# Patient Record
Sex: MALE | Race: WHITE | NOT HISPANIC OR LATINO | Employment: OTHER | URBAN - NONMETROPOLITAN AREA
[De-identification: names, ages, dates, MRNs, and addresses within clinical notes are randomized per-mention and may not be internally consistent; named-entity substitution may affect disease eponyms.]

---

## 2022-11-04 ENCOUNTER — HOSPITAL ENCOUNTER (EMERGENCY)
Facility: HOSPITAL | Age: 87
Discharge: LEFT BEFORE DISCHARGE | End: 2022-11-04

## 2022-11-04 VITALS
HEART RATE: 79 BPM | DIASTOLIC BLOOD PRESSURE: 67 MMHG | BODY MASS INDEX: 26.6 KG/M2 | TEMPERATURE: 98.4 F | WEIGHT: 190 LBS | HEIGHT: 71 IN | SYSTOLIC BLOOD PRESSURE: 144 MMHG | OXYGEN SATURATION: 99 % | RESPIRATION RATE: 18 BRPM

## 2022-11-04 PROCEDURE — 99211 OFF/OP EST MAY X REQ PHY/QHP: CPT

## 2022-11-04 NOTE — ED NOTES
Pt approached nursing staff stating he is going to leave.  Zak CONDE offered to get provider to talk with pt.   Pt states that it is not necessary and he will f/u with his PCP.  Zak CONDE instructed pt and spouse that if anything new or worsening to come back to ED.  Pt not in any distress at this time.  No neuro deficits noted.  Vss.  Pt A/Ox 4.  Pt ambulated out of ED area with spouse.  Racquel ALLISON aware.

## 2022-11-06 ENCOUNTER — APPOINTMENT (OUTPATIENT)
Dept: CT IMAGING | Facility: HOSPITAL | Age: 87
End: 2022-11-06

## 2022-11-06 ENCOUNTER — APPOINTMENT (OUTPATIENT)
Dept: GENERAL RADIOLOGY | Facility: HOSPITAL | Age: 87
End: 2022-11-06

## 2022-11-06 ENCOUNTER — HOSPITAL ENCOUNTER (OUTPATIENT)
Facility: HOSPITAL | Age: 87
Setting detail: OBSERVATION
Discharge: HOME | End: 2022-11-07
Attending: FAMILY MEDICINE | Admitting: FAMILY MEDICINE

## 2022-11-06 DIAGNOSIS — R55 SYNCOPE, UNSPECIFIED SYNCOPE TYPE: ICD-10-CM

## 2022-11-06 DIAGNOSIS — I48.91 ATRIAL FIBRILLATION, UNSPECIFIED TYPE: ICD-10-CM

## 2022-11-06 DIAGNOSIS — R41.82 ALTERED MENTAL STATUS, UNSPECIFIED ALTERED MENTAL STATUS TYPE: Primary | ICD-10-CM

## 2022-11-06 DIAGNOSIS — N39.0 URINARY TRACT INFECTION WITHOUT HEMATURIA, SITE UNSPECIFIED: ICD-10-CM

## 2022-11-06 PROBLEM — R73.9 ELEVATED BLOOD SUGAR: Status: ACTIVE | Noted: 2022-11-06

## 2022-11-06 PROBLEM — N28.9 RENAL INSUFFICIENCY: Status: ACTIVE | Noted: 2022-11-06

## 2022-11-06 PROBLEM — D64.9 ANEMIA, UNSPECIFIED: Status: ACTIVE | Noted: 2022-11-06

## 2022-11-06 PROBLEM — I48.11 LONGSTANDING PERSISTENT ATRIAL FIBRILLATION: Status: ACTIVE | Noted: 2022-11-06

## 2022-11-06 LAB
ALBUMIN SERPL-MCNC: 4.5 G/DL (ref 3.5–5.2)
ALBUMIN/GLOB SERPL: 1.5 G/DL
ALP SERPL-CCNC: 98 U/L (ref 39–117)
ALT SERPL W P-5'-P-CCNC: 14 U/L (ref 1–41)
ANION GAP SERPL CALCULATED.3IONS-SCNC: 8 MMOL/L (ref 5–15)
AST SERPL-CCNC: 22 U/L (ref 1–40)
BACTERIA UR QL AUTO: ABNORMAL /HPF
BASOPHILS # BLD AUTO: 0.05 10*3/MM3 (ref 0–0.2)
BASOPHILS NFR BLD AUTO: 0.6 % (ref 0–1.5)
BILIRUB SERPL-MCNC: 0.7 MG/DL (ref 0–1.2)
BILIRUB UR QL STRIP: NEGATIVE
BUN SERPL-MCNC: 33 MG/DL (ref 8–23)
BUN/CREAT SERPL: 25 (ref 7–25)
CALCIUM SPEC-SCNC: 9.4 MG/DL (ref 8.6–10.5)
CHLORIDE SERPL-SCNC: 103 MMOL/L (ref 98–107)
CHOLEST SERPL-MCNC: 180 MG/DL (ref 0–200)
CLARITY UR: ABNORMAL
CO2 SERPL-SCNC: 27 MMOL/L (ref 22–29)
COLOR UR: YELLOW
CREAT SERPL-MCNC: 1.32 MG/DL (ref 0.76–1.27)
CRP SERPL-MCNC: <0.3 MG/DL (ref 0–0.5)
D-LACTATE SERPL-SCNC: 1.1 MMOL/L (ref 0.5–2)
DEPRECATED RDW RBC AUTO: 50 FL (ref 37–54)
EGFRCR SERPLBLD CKD-EPI 2021: 52.5 ML/MIN/1.73
EOSINOPHIL # BLD AUTO: 0.09 10*3/MM3 (ref 0–0.4)
EOSINOPHIL NFR BLD AUTO: 1.1 % (ref 0.3–6.2)
ERYTHROCYTE [DISTWIDTH] IN BLOOD BY AUTOMATED COUNT: 14.9 % (ref 12.3–15.4)
GLOBULIN UR ELPH-MCNC: 3 GM/DL
GLUCOSE SERPL-MCNC: 110 MG/DL (ref 65–99)
GLUCOSE UR STRIP-MCNC: NEGATIVE MG/DL
HCT VFR BLD AUTO: 36.4 % (ref 37.5–51)
HDLC SERPL-MCNC: 65 MG/DL (ref 40–60)
HGB BLD-MCNC: 11.7 G/DL (ref 13–17.7)
HGB UR QL STRIP.AUTO: NEGATIVE
HYALINE CASTS UR QL AUTO: ABNORMAL /LPF
IMM GRANULOCYTES # BLD AUTO: 0.02 10*3/MM3 (ref 0–0.05)
IMM GRANULOCYTES NFR BLD AUTO: 0.3 % (ref 0–0.5)
KETONES UR QL STRIP: NEGATIVE
LDLC SERPL CALC-MCNC: 104 MG/DL (ref 0–100)
LDLC/HDLC SERPL: 1.59 {RATIO}
LEUKOCYTE ESTERASE UR QL STRIP.AUTO: ABNORMAL
LYMPHOCYTES # BLD AUTO: 1.38 10*3/MM3 (ref 0.7–3.1)
LYMPHOCYTES NFR BLD AUTO: 17.4 % (ref 19.6–45.3)
MAGNESIUM SERPL-MCNC: 2.3 MG/DL (ref 1.6–2.4)
MCH RBC QN AUTO: 29.5 PG (ref 26.6–33)
MCHC RBC AUTO-ENTMCNC: 32.1 G/DL (ref 31.5–35.7)
MCV RBC AUTO: 91.7 FL (ref 79–97)
MONOCYTES # BLD AUTO: 0.64 10*3/MM3 (ref 0.1–0.9)
MONOCYTES NFR BLD AUTO: 8.1 % (ref 5–12)
NEUTROPHILS NFR BLD AUTO: 5.74 10*3/MM3 (ref 1.7–7)
NEUTROPHILS NFR BLD AUTO: 72.5 % (ref 42.7–76)
NITRITE UR QL STRIP: NEGATIVE
NRBC BLD AUTO-RTO: 0 /100 WBC (ref 0–0.2)
PH UR STRIP.AUTO: 8 [PH] (ref 5–8)
PLATELET # BLD AUTO: 258 10*3/MM3 (ref 140–450)
PMV BLD AUTO: 8.9 FL (ref 6–12)
POTASSIUM SERPL-SCNC: 4.6 MMOL/L (ref 3.5–5.2)
PROT SERPL-MCNC: 7.5 G/DL (ref 6–8.5)
PROT UR QL STRIP: ABNORMAL
RBC # BLD AUTO: 3.97 10*6/MM3 (ref 4.14–5.8)
RBC # UR STRIP: ABNORMAL /HPF
REF LAB TEST METHOD: ABNORMAL
SODIUM SERPL-SCNC: 138 MMOL/L (ref 136–145)
SP GR UR STRIP: 1.02 (ref 1–1.03)
SQUAMOUS #/AREA URNS HPF: ABNORMAL /HPF
TRIGL SERPL-MCNC: 58 MG/DL (ref 0–150)
TROPONIN T SERPL-MCNC: <0.01 NG/ML (ref 0–0.03)
UROBILINOGEN UR QL STRIP: ABNORMAL
VLDLC SERPL-MCNC: 11 MG/DL (ref 5–40)
WBC # UR STRIP: ABNORMAL /HPF
WBC NRBC COR # BLD: 7.92 10*3/MM3 (ref 3.4–10.8)

## 2022-11-06 PROCEDURE — 93005 ELECTROCARDIOGRAM TRACING: CPT | Performed by: NURSE PRACTITIONER

## 2022-11-06 PROCEDURE — 93010 ELECTROCARDIOGRAM REPORT: CPT | Performed by: INTERNAL MEDICINE

## 2022-11-06 PROCEDURE — 70450 CT HEAD/BRAIN W/O DYE: CPT

## 2022-11-06 PROCEDURE — 99285 EMERGENCY DEPT VISIT HI MDM: CPT

## 2022-11-06 PROCEDURE — 81001 URINALYSIS AUTO W/SCOPE: CPT | Performed by: NURSE PRACTITIONER

## 2022-11-06 PROCEDURE — G0378 HOSPITAL OBSERVATION PER HR: HCPCS

## 2022-11-06 PROCEDURE — 25010000002 ENOXAPARIN PER 10 MG: Performed by: FAMILY MEDICINE

## 2022-11-06 PROCEDURE — 80061 LIPID PANEL: CPT | Performed by: FAMILY MEDICINE

## 2022-11-06 PROCEDURE — 36415 COLL VENOUS BLD VENIPUNCTURE: CPT | Performed by: FAMILY MEDICINE

## 2022-11-06 PROCEDURE — G0103 PSA SCREENING: HCPCS | Performed by: FAMILY MEDICINE

## 2022-11-06 PROCEDURE — 87086 URINE CULTURE/COLONY COUNT: CPT | Performed by: NURSE PRACTITIONER

## 2022-11-06 PROCEDURE — 83605 ASSAY OF LACTIC ACID: CPT | Performed by: NURSE PRACTITIONER

## 2022-11-06 PROCEDURE — 25010000002 CEFTRIAXONE PER 250 MG: Performed by: NURSE PRACTITIONER

## 2022-11-06 PROCEDURE — 96372 THER/PROPH/DIAG INJ SC/IM: CPT

## 2022-11-06 PROCEDURE — 96365 THER/PROPH/DIAG IV INF INIT: CPT

## 2022-11-06 PROCEDURE — 86140 C-REACTIVE PROTEIN: CPT | Performed by: NURSE PRACTITIONER

## 2022-11-06 PROCEDURE — 85025 COMPLETE CBC W/AUTO DIFF WBC: CPT | Performed by: NURSE PRACTITIONER

## 2022-11-06 PROCEDURE — 87040 BLOOD CULTURE FOR BACTERIA: CPT | Performed by: FAMILY MEDICINE

## 2022-11-06 PROCEDURE — 80306 DRUG TEST PRSMV INSTRMNT: CPT | Performed by: CLINICAL NURSE SPECIALIST

## 2022-11-06 PROCEDURE — 83735 ASSAY OF MAGNESIUM: CPT | Performed by: NURSE PRACTITIONER

## 2022-11-06 PROCEDURE — 71045 X-RAY EXAM CHEST 1 VIEW: CPT

## 2022-11-06 PROCEDURE — 80053 COMPREHEN METABOLIC PANEL: CPT | Performed by: NURSE PRACTITIONER

## 2022-11-06 PROCEDURE — 83036 HEMOGLOBIN GLYCOSYLATED A1C: CPT | Performed by: CLINICAL NURSE SPECIALIST

## 2022-11-06 PROCEDURE — 84484 ASSAY OF TROPONIN QUANT: CPT | Performed by: FAMILY MEDICINE

## 2022-11-06 PROCEDURE — 93005 ELECTROCARDIOGRAM TRACING: CPT | Performed by: FAMILY MEDICINE

## 2022-11-06 PROCEDURE — 84484 ASSAY OF TROPONIN QUANT: CPT | Performed by: NURSE PRACTITIONER

## 2022-11-06 RX ORDER — SODIUM CHLORIDE 0.9 % (FLUSH) 0.9 %
10 SYRINGE (ML) INJECTION AS NEEDED
Status: DISCONTINUED | OUTPATIENT
Start: 2022-11-06 | End: 2022-11-07 | Stop reason: HOSPADM

## 2022-11-06 RX ORDER — ONDANSETRON 2 MG/ML
4 INJECTION INTRAMUSCULAR; INTRAVENOUS EVERY 6 HOURS PRN
Status: DISCONTINUED | OUTPATIENT
Start: 2022-11-06 | End: 2022-11-07 | Stop reason: HOSPADM

## 2022-11-06 RX ORDER — NITROGLYCERIN 0.4 MG/1
0.4 TABLET SUBLINGUAL
Status: DISCONTINUED | OUTPATIENT
Start: 2022-11-06 | End: 2022-11-07 | Stop reason: HOSPADM

## 2022-11-06 RX ORDER — SODIUM CHLORIDE 0.9 % (FLUSH) 0.9 %
10 SYRINGE (ML) INJECTION EVERY 12 HOURS SCHEDULED
Status: DISCONTINUED | OUTPATIENT
Start: 2022-11-06 | End: 2022-11-07 | Stop reason: HOSPADM

## 2022-11-06 RX ORDER — ENOXAPARIN SODIUM 100 MG/ML
1 INJECTION SUBCUTANEOUS EVERY 12 HOURS
Status: DISCONTINUED | OUTPATIENT
Start: 2022-11-06 | End: 2022-11-07 | Stop reason: HOSPADM

## 2022-11-06 RX ADMIN — ENOXAPARIN SODIUM 90 MG: 100 INJECTION SUBCUTANEOUS at 21:14

## 2022-11-06 RX ADMIN — SODIUM CHLORIDE 1 G: 9 INJECTION, SOLUTION INTRAVENOUS at 19:49

## 2022-11-06 NOTE — ED PROVIDER NOTES
Subjective   History of Present Illness  Patient is an 86-year-old male that presents ER today with complaint of multiple near syncopal episodes.  The patient states that he had 3 of these yesterday and 1 today.  He states that that when these occur he feels disoriented and starts shaking all over.  He states that he does not actually lose consciousness.  He states he does not really know what happens during that timeframe though.  He states that today he had an episode when he went to put a video and the video player at home.  He did not fall to the ground.  He is had no recent falls or head injuries.  He has no headache, weakness, numbness or tingling.  He has no neurological deficits.  He has no history of seizures in the past.  He denies any loss of bowel or bladder control with these episodes, has but his tongue or sustain other injuries.  He states that the episode today lasted approximately 1 minute.  Patient states he had 3 episodes yesterday.  He states that he came to the ER last night but it was too busy so he left however after having an episode today his daughters encouraged him to come back to be evaluated.  He denies any medical problems and takes no medications.  He presents to the ER today for further evaluation.    History provided by:  Patient   used: No        Review of Systems   Neurological: Positive for tremors.   Psychiatric/Behavioral: Positive for confusion.   All other systems reviewed and are negative.      History reviewed. No pertinent past medical history.    No Known Allergies    No past surgical history on file.    History reviewed. No pertinent family history.    Social History     Socioeconomic History   • Marital status:            Objective   Physical Exam  Vitals and nursing note reviewed.   Constitutional:       Appearance: Normal appearance.   HENT:      Head: Normocephalic and atraumatic.      Right Ear: External ear normal.      Left Ear: External  ear normal.      Mouth/Throat:      Mouth: Mucous membranes are moist.      Pharynx: Oropharynx is clear.   Eyes:      Conjunctiva/sclera: Conjunctivae normal.      Pupils: Pupils are equal, round, and reactive to light.   Cardiovascular:      Rate and Rhythm: Normal rate and regular rhythm.   Pulmonary:      Effort: Pulmonary effort is normal.      Breath sounds: Normal breath sounds.   Abdominal:      General: Bowel sounds are normal.      Palpations: Abdomen is soft.   Skin:     General: Skin is warm and dry.      Capillary Refill: Capillary refill takes less than 2 seconds.   Neurological:      General: No focal deficit present.      Mental Status: He is alert and oriented to person, place, and time.      Comments: Pt A&O x 3, speech clear and appropriate, no facial droop or paresthesias noted, PERRL, EOM intact, tongue midline, shoulder shrug normal, no upper or lower ext drift noted, upper extremity strength 5 out of 5, lower extremity strength 5 out of 5.  Finger-to-nose test normal.  Heel-to-shin test normal.   Psychiatric:         Mood and Affect: Mood normal.         Behavior: Behavior normal.         Procedures           ED Course  ED Course as of 11/07/22 1938   Mon Nov 07, 2022 1935 Patient is labwork shows that he does have a urinary tract infection.  This was treated with Rocephin.  BUN of 33 with creatinine 1.3.  I have no further labs to compare this to.  EKG shows new onset A. fib rate controlled.  CT head and chest x-ray showed no acute findings.  I discussed the findings with the patient and family.  Has had 4 syncopal episodes in the past 2 days.  I recommended that he be admitted.  They agree with this.  Patient case discussed with Dr. Gottlieb who is kindly agreed with admission.  Admitted at this time in stable condition. [LF]   1937 Vance Syncope Rule - MDCalc  Calculated on Nov 07 2022 8:37 PM  Patient is NOT in the low-risk group for serious outcome. [LF]      ED Course User  Index  [LF] Bouchra Stiles, APRN                                 MRI Brain With & Without Contrast   Final Result   Impression:       1. No acute intracranial process. In particular, no acute ischemia.       This report was finalized on 11/07/2022 13:14 by Dr Malik Brand, .      US Carotid Bilateral   Final Result   Impression:   1. There is less than 50% stenosis of the right internal carotid artery.   2. There is less than 50% stenosis of the left internal carotid artery.   3. Antegrade flow is demonstrated in bilateral vertebral arteries.       Comments: Bilateral carotid vertebral arterial duplex scan was   performed.       Grayscale imaging shows intimal thickening and calcified elements at the   carotid bifurcation. The right internal carotid artery peak systolic   velocity is 77.6 cm/sec. The end-diastolic velocity is 30.4 cm/sec. The   right ICA/CCA ratio is approximately 1.3 . These findings correlate with   less than 50% stenosis of the right internal carotid artery.       Grayscale imaging shows intimal thickening and calcified elements at the   carotid bifurcation. The left internal carotid artery peak systolic   velocity is 70.7 cm/sec. The end-diastolic velocity is 32.7 cm/sec. The   left ICA/CCA ratio is approximately 0.8 . These findings correlate with   less than 50% stenosis of the left internal carotid artery.       Antegrade flow is demonstrated in bilateral vertebral arteries.       This report was finalized on 11/07/2022 13:00 by Dr. Jonny Silverman MD.      CT Head Without Contrast   Final Result   1. No hemorrhage, edema or mass effect. No acute findings.   2. Minimal age-appropriate atrophy and chronic small vessel ischemic   white matter disease.       The full report of this exam was immediately signed and available to the   emergency room. The patient is currently in the emergency room.       This report was finalized on 11/06/2022 18:44 by Dr. Cal Wilder MD.      XR Chest 1  View   Final Result   1. No acute appearing infiltrate.   2. Bronchial wall thickening, likely chronic.   3. Heart size is borderline.           This report was finalized on 11/06/2022 17:37 by Dr. Cal Wilder MD.        Labs Reviewed   COMPREHENSIVE METABOLIC PANEL - Abnormal; Notable for the following components:       Result Value    Glucose 110 (*)     BUN 33 (*)     Creatinine 1.32 (*)     eGFR 52.5 (*)     All other components within normal limits    Narrative:     GFR Normal >60  Chronic Kidney Disease <60  Kidney Failure <15    The GFR formula is only valid for adults with stable renal function between ages 18 and 70.   URINALYSIS W/ CULTURE IF INDICATED - Abnormal; Notable for the following components:    Appearance, UA Cloudy (*)     Protein, UA Trace (*)     Leuk Esterase, UA Large (3+) (*)     All other components within normal limits    Narrative:     In absence of clinical symptoms, the presence of pyuria, bacteria, and/or nitrites on the urinalysis result does not correlate with infection.   CBC WITH AUTO DIFFERENTIAL - Abnormal; Notable for the following components:    RBC 3.97 (*)     Hemoglobin 11.7 (*)     Hematocrit 36.4 (*)     Lymphocyte % 17.4 (*)     All other components within normal limits   URINALYSIS, MICROSCOPIC ONLY - Abnormal; Notable for the following components:    RBC, UA 3-5 (*)     WBC, UA Too Numerous to Count (*)     Bacteria, UA 4+ (*)     All other components within normal limits   LIPID PANEL - Abnormal; Notable for the following components:    HDL Cholesterol 65 (*)     LDL Cholesterol  104 (*)     All other components within normal limits    Narrative:     Cholesterol Reference Ranges  (U.S. Department of Health and Human Services ATP III Classifications)    Desirable          <200 mg/dL  Borderline High    200-239 mg/dL  High Risk          >240 mg/dL      Triglyceride Reference Ranges  (U.S. Department of Health and Human Services ATP III Classifications)    Normal            <150 mg/dL  Borderline High  150-199 mg/dL  High             200-499 mg/dL  Very High        >500 mg/dL    HDL Reference Ranges  (U.S. Department of Health and Human Services ATP III Classifications)    Low     <40 mg/dl (major risk factor for CHD)  High    >60 mg/dl ('negative' risk factor for CHD)        LDL Reference Ranges  (U.S. Department of Health and Human Services ATP III Classifications)    Optimal          <100 mg/dL  Near Optimal     100-129 mg/dL  Borderline High  130-159 mg/dL  High             160-189 mg/dL  Very High        >189 mg/dL   COMPREHENSIVE METABOLIC PANEL - Abnormal; Notable for the following components:    BUN 28 (*)     All other components within normal limits    Narrative:     GFR Normal >60  Chronic Kidney Disease <60  Kidney Failure <15    The GFR formula is only valid for adults with stable renal function between ages 18 and 70.   HEMOGLOBIN A1C - Abnormal; Notable for the following components:    Hemoglobin A1C 5.70 (*)     All other components within normal limits    Narrative:     Hemoglobin A1C Ranges:    Increased Risk for Diabetes  5.7% to 6.4%  Diabetes                     >= 6.5%  Diabetic Goal                < 7.0%   TROPONIN (IN-HOUSE) - Normal    Narrative:     Troponin T Reference Range:  <= 0.03 ng/mL-   Negative for AMI  >0.03 ng/mL-     Abnormal for myocardial necrosis.  Clinicians would have to utilize clinical acumen, EKG, Troponin and serial changes to determine if it is an Acute Myocardial Infarction or myocardial injury due to an underlying chronic condition.       Results may be falsely decreased if patient taking Biotin.     LACTIC ACID, PLASMA - Normal   C-REACTIVE PROTEIN - Normal   MAGNESIUM - Normal   TROPONIN (IN-HOUSE) - Normal    Narrative:     Troponin T Reference Range:  <= 0.03 ng/mL-   Negative for AMI  >0.03 ng/mL-     Abnormal for myocardial necrosis.  Clinicians would have to utilize clinical acumen, EKG, Troponin and serial changes to  determine if it is an Acute Myocardial Infarction or myocardial injury due to an underlying chronic condition.       Results may be falsely decreased if patient taking Biotin.     TROPONIN (IN-HOUSE) - Normal    Narrative:     Troponin T Reference Range:  <= 0.03 ng/mL-   Negative for AMI  >0.03 ng/mL-     Abnormal for myocardial necrosis.  Clinicians would have to utilize clinical acumen, EKG, Troponin and serial changes to determine if it is an Acute Myocardial Infarction or myocardial injury due to an underlying chronic condition.       Results may be falsely decreased if patient taking Biotin.     TROPONIN (IN-HOUSE) - Normal    Narrative:     Troponin T Reference Range:  <= 0.03 ng/mL-   Negative for AMI  >0.03 ng/mL-     Abnormal for myocardial necrosis.  Clinicians would have to utilize clinical acumen, EKG, Troponin and serial changes to determine if it is an Acute Myocardial Infarction or myocardial injury due to an underlying chronic condition.       Results may be falsely decreased if patient taking Biotin.     PSA SCREEN - Normal    Narrative:     Results may be falsely decreased if patient taking Biotin.     TSH - Normal   T4, FREE - Normal    Narrative:     Results may be falsely increased if patient taking Biotin.     URINE DRUG SCREEN - Normal    Narrative:     Cutoff For Drugs Screened:    Amphetamines               500 ng/ml  Barbiturates               200 ng/ml  Benzodiazepines            150 ng/ml  Cocaine                    150 ng/ml  Methadone                  200 ng/ml  Opiates                    100 ng/ml  Phencyclidine               25 ng/ml  THC                            50 ng/ml  Methamphetamine            500 ng/ml  Tricyclic Antidepressants  300 ng/ml  Oxycodone                  100 ng/ml  Propoxyphene               300 ng/ml  Buprenorphine               10 ng/ml    The normal value for all drugs tested is negative. This report includes unconfirmed screening results, with the cutoff  values listed, to be used for medical treatment purposes only.  Unconfirmed results must not be used for non-medical purposes such as employment or legal testing.  Clinical consideration should be applied to any drug of abuse test, particularly when unconfirmed results are used.     URINE CULTURE   BLOOD CULTURE   BLOOD CULTURE   VITAMIN B12   FOLATE   CBC AND DIFFERENTIAL    Narrative:     The following orders were created for panel order CBC & Differential.  Procedure                               Abnormality         Status                     ---------                               -----------         ------                     CBC Auto Differential[[id number]]        Abnormal            Final result                 Please view results for these tests on the individual orders.               MDM  Number of Diagnoses or Management Options  Altered mental status, unspecified altered mental status type: new and requires workup  Atrial fibrillation, unspecified type (HCC): new and requires workup  Syncope, unspecified syncope type: new and requires workup  Urinary tract infection without hematuria, site unspecified: new and requires workup     Amount and/or Complexity of Data Reviewed  Clinical lab tests: ordered and reviewed  Tests in the radiology section of CPT®: ordered and reviewed  Tests in the medicine section of CPT®: ordered and reviewed  Discuss the patient with other providers: yes (Dr. Harvey Gottlieb)    Patient Progress  Patient progress: stable      Final diagnoses:   Altered mental status, unspecified altered mental status type   Syncope, unspecified syncope type   Urinary tract infection without hematuria, site unspecified   Atrial fibrillation, unspecified type (HCC)       ED Disposition  ED Disposition     ED Disposition   Decision to Admit    Condition   --    Comment   Level of Care: Telemetry [5]   Diagnosis: Altered mental status, unspecified altered mental status type [[id number]]   Admitting  Physician: DEMETRIO PERRIN [[id number]]   Attending Physician: DEMETRIO PERRIN [[id number]]               Perez Manzo MD  [address]  [facility] [address]  [phone]    Follow up  Follow up new pt appt. on 11/10/2022 at 9:15 am    Cholo Huggins, APRN  [address] [facility] [address]  [facility] [address]  [phone]    Follow up  Follow up on 11/23/2022 at 3:45pm    Provider, No Known  [facility] [address]  [phone]               Medication List      New Prescriptions    atorvastatin 20 MG tablet  Commonly known as: Lipitor  Take 1 tablet by mouth Every Night.     cefdinir 300 MG capsule  Commonly known as: OMNICEF  Take 1 capsule by mouth 2 (Two) Times a Day for 3 days. Begin 11/8/2022  Start taking on: November 8, 2022  Notes to patient:  Next dose due tonight 11-7     Eliquis 5 MG tablet tablet  Generic drug: apixaban  Take 1 tablet by mouth 2 (Two) Times a Day.  Notes to patient: Next dose due 11-7           Where to Get Your Medications      These medications were sent to [facility] [place]  [address] [place] [address] [address], [place] [address]    Hours: 7AM-5PM Mon-Fri Phone: [phone]   · atorvastatin 20 MG tablet  · cefdinir 300 MG capsule  · Eliquis 5 MG tablet tablet          Bouchra Stiles, ENA  11/07/22 1938

## 2022-11-07 ENCOUNTER — APPOINTMENT (OUTPATIENT)
Dept: NEUROLOGY | Facility: HOSPITAL | Age: 87
End: 2022-11-07

## 2022-11-07 ENCOUNTER — APPOINTMENT (OUTPATIENT)
Dept: CARDIOLOGY | Facility: HOSPITAL | Age: 87
End: 2022-11-07

## 2022-11-07 ENCOUNTER — APPOINTMENT (OUTPATIENT)
Dept: MRI IMAGING | Facility: HOSPITAL | Age: 87
End: 2022-11-07

## 2022-11-07 ENCOUNTER — APPOINTMENT (OUTPATIENT)
Dept: ULTRASOUND IMAGING | Facility: HOSPITAL | Age: 87
End: 2022-11-07

## 2022-11-07 VITALS
BODY MASS INDEX: 25.9 KG/M2 | HEART RATE: 71 BPM | OXYGEN SATURATION: 95 % | SYSTOLIC BLOOD PRESSURE: 124 MMHG | DIASTOLIC BLOOD PRESSURE: 63 MMHG | WEIGHT: 185 LBS | HEIGHT: 71 IN | TEMPERATURE: 97.7 F | RESPIRATION RATE: 18 BRPM

## 2022-11-07 PROBLEM — R40.4 EPISODIC ALTERED AWARENESS: Status: ACTIVE | Noted: 2022-11-07

## 2022-11-07 PROBLEM — G93.41 METABOLIC ENCEPHALOPATHY: Status: ACTIVE | Noted: 2022-11-07

## 2022-11-07 PROBLEM — E78.49 OTHER HYPERLIPIDEMIA: Status: ACTIVE | Noted: 2022-11-07

## 2022-11-07 PROBLEM — I48.20 CHRONIC ATRIAL FIBRILLATION: Status: ACTIVE | Noted: 2022-11-07

## 2022-11-07 LAB
ALBUMIN SERPL-MCNC: 4.3 G/DL (ref 3.5–5.2)
ALBUMIN/GLOB SERPL: 1.3 G/DL
ALP SERPL-CCNC: 97 U/L (ref 39–117)
ALT SERPL W P-5'-P-CCNC: 16 U/L (ref 1–41)
AMPHET+METHAMPHET UR QL: NEGATIVE
AMPHETAMINES UR QL: NEGATIVE
ANION GAP SERPL CALCULATED.3IONS-SCNC: 8 MMOL/L (ref 5–15)
AST SERPL-CCNC: 23 U/L (ref 1–40)
BARBITURATES UR QL SCN: NEGATIVE
BENZODIAZ UR QL SCN: NEGATIVE
BH CV ECHO MEAS - AO MAX PG: 14 MMHG
BH CV ECHO MEAS - AO MEAN PG: 7 MMHG
BH CV ECHO MEAS - AO ROOT DIAM: 3.4 CM
BH CV ECHO MEAS - AO V2 MAX: 187 CM/SEC
BH CV ECHO MEAS - AO V2 VTI: 39.8 CM
BH CV ECHO MEAS - AVA(I,D): 2.05 CM2
BH CV ECHO MEAS - EDV(CUBED): 74.1 ML
BH CV ECHO MEAS - EDV(MOD-SP2): 54 ML
BH CV ECHO MEAS - EDV(MOD-SP4): 40 ML
BH CV ECHO MEAS - EF(MOD-BP): 62 %
BH CV ECHO MEAS - EF(MOD-SP2): 66.7 %
BH CV ECHO MEAS - EF(MOD-SP4): 52.5 %
BH CV ECHO MEAS - ESV(CUBED): 29.8 ML
BH CV ECHO MEAS - ESV(MOD-SP2): 18 ML
BH CV ECHO MEAS - ESV(MOD-SP4): 19 ML
BH CV ECHO MEAS - FS: 26.2 %
BH CV ECHO MEAS - IVS/LVPW: 1 CM
BH CV ECHO MEAS - IVSD: 1.2 CM
BH CV ECHO MEAS - LA DIMENSION: 4.5 CM
BH CV ECHO MEAS - LAT PEAK E' VEL: 9.3 CM/SEC
BH CV ECHO MEAS - LV DIASTOLIC VOL/BSA (35-75): 19.6 CM2
BH CV ECHO MEAS - LV MASS(C)D: 178.2 GRAMS
BH CV ECHO MEAS - LV MAX PG: 8.1 MMHG
BH CV ECHO MEAS - LV MEAN PG: 4 MMHG
BH CV ECHO MEAS - LV SYSTOLIC VOL/BSA (12-30): 9.3 CM2
BH CV ECHO MEAS - LV V1 MAX: 142 CM/SEC
BH CV ECHO MEAS - LV V1 VTI: 26 CM
BH CV ECHO MEAS - LVIDD: 4.2 CM
BH CV ECHO MEAS - LVIDS: 3.1 CM
BH CV ECHO MEAS - LVOT AREA: 3.1 CM2
BH CV ECHO MEAS - LVOT DIAM: 2 CM
BH CV ECHO MEAS - LVPWD: 1.2 CM
BH CV ECHO MEAS - MED PEAK E' VEL: 6.52 CM/SEC
BH CV ECHO MEAS - MR MAX PG: 19.6 MMHG
BH CV ECHO MEAS - MR MAX VEL: 220.5 CM/SEC
BH CV ECHO MEAS - MV DEC TIME: 0.13 MSEC
BH CV ECHO MEAS - MV E MAX VEL: 96.6 CM/SEC
BH CV ECHO MEAS - SI(MOD-SP2): 17.6 ML/M2
BH CV ECHO MEAS - SI(MOD-SP4): 10.3 ML/M2
BH CV ECHO MEAS - SV(LVOT): 81.7 ML
BH CV ECHO MEAS - SV(MOD-SP2): 36 ML
BH CV ECHO MEAS - SV(MOD-SP4): 21 ML
BH CV ECHO MEAS - TR MAX PG: 27.5 MMHG
BH CV ECHO MEAS - TR MAX VEL: 262 CM/SEC
BH CV ECHO MEASUREMENTS AVERAGE E/E' RATIO: 12.21
BILIRUB SERPL-MCNC: 1 MG/DL (ref 0–1.2)
BUN SERPL-MCNC: 28 MG/DL (ref 8–23)
BUN/CREAT SERPL: 23.7 (ref 7–25)
BUPRENORPHINE SERPL-MCNC: NEGATIVE NG/ML
CALCIUM SPEC-SCNC: 9.6 MG/DL (ref 8.6–10.5)
CANNABINOIDS SERPL QL: NEGATIVE
CHLORIDE SERPL-SCNC: 105 MMOL/L (ref 98–107)
CO2 SERPL-SCNC: 27 MMOL/L (ref 22–29)
COCAINE UR QL: NEGATIVE
CREAT SERPL-MCNC: 1.18 MG/DL (ref 0.76–1.27)
EGFRCR SERPLBLD CKD-EPI 2021: 60.1 ML/MIN/1.73
FOLATE SERPL-MCNC: 11.6 NG/ML (ref 4.78–24.2)
GLOBULIN UR ELPH-MCNC: 3.2 GM/DL
GLUCOSE SERPL-MCNC: 92 MG/DL (ref 65–99)
HBA1C MFR BLD: 5.7 % (ref 4.8–5.6)
LEFT ATRIUM VOLUME INDEX: 45.1 ML/M2
LEFT ATRIUM VOLUME: 92 ML
MAXIMAL PREDICTED HEART RATE: 134 BPM
METHADONE UR QL SCN: NEGATIVE
OPIATES UR QL: NEGATIVE
OXYCODONE UR QL SCN: NEGATIVE
PCP UR QL SCN: NEGATIVE
POTASSIUM SERPL-SCNC: 4.2 MMOL/L (ref 3.5–5.2)
PROPOXYPH UR QL: NEGATIVE
PROT SERPL-MCNC: 7.5 G/DL (ref 6–8.5)
PSA SERPL-MCNC: 3.52 NG/ML (ref 0–4)
QT INTERVAL: 378 MS
QT INTERVAL: 380 MS
QT INTERVAL: 402 MS
QTC INTERVAL: 401 MS
QTC INTERVAL: 427 MS
QTC INTERVAL: 442 MS
SODIUM SERPL-SCNC: 140 MMOL/L (ref 136–145)
STRESS TARGET HR: 114 BPM
T4 FREE SERPL-MCNC: 1.15 NG/DL (ref 0.93–1.7)
TRICYCLICS UR QL SCN: NEGATIVE
TROPONIN T SERPL-MCNC: <0.01 NG/ML (ref 0–0.03)
TSH SERPL DL<=0.05 MIU/L-ACNC: 2.25 UIU/ML (ref 0.27–4.2)
VIT B12 BLD-MCNC: 378 PG/ML (ref 211–946)

## 2022-11-07 PROCEDURE — 84443 ASSAY THYROID STIM HORMONE: CPT | Performed by: FAMILY MEDICINE

## 2022-11-07 PROCEDURE — 80053 COMPREHEN METABOLIC PANEL: CPT | Performed by: FAMILY MEDICINE

## 2022-11-07 PROCEDURE — 99204 OFFICE O/P NEW MOD 45 MIN: CPT | Performed by: NURSE PRACTITIONER

## 2022-11-07 PROCEDURE — 93005 ELECTROCARDIOGRAM TRACING: CPT | Performed by: FAMILY MEDICINE

## 2022-11-07 PROCEDURE — 82607 VITAMIN B-12: CPT | Performed by: CLINICAL NURSE SPECIALIST

## 2022-11-07 PROCEDURE — 93010 ELECTROCARDIOGRAM REPORT: CPT | Performed by: INTERNAL MEDICINE

## 2022-11-07 PROCEDURE — 95819 EEG AWAKE AND ASLEEP: CPT

## 2022-11-07 PROCEDURE — G0378 HOSPITAL OBSERVATION PER HR: HCPCS

## 2022-11-07 PROCEDURE — A9577 INJ MULTIHANCE: HCPCS | Performed by: FAMILY MEDICINE

## 2022-11-07 PROCEDURE — 96372 THER/PROPH/DIAG INJ SC/IM: CPT

## 2022-11-07 PROCEDURE — 84439 ASSAY OF FREE THYROXINE: CPT | Performed by: FAMILY MEDICINE

## 2022-11-07 PROCEDURE — 93880 EXTRACRANIAL BILAT STUDY: CPT | Performed by: SURGERY

## 2022-11-07 PROCEDURE — 93306 TTE W/DOPPLER COMPLETE: CPT

## 2022-11-07 PROCEDURE — 70553 MRI BRAIN STEM W/O & W/DYE: CPT

## 2022-11-07 PROCEDURE — 25010000002 ENOXAPARIN PER 10 MG: Performed by: FAMILY MEDICINE

## 2022-11-07 PROCEDURE — 99214 OFFICE O/P EST MOD 30 MIN: CPT | Performed by: PSYCHIATRY & NEUROLOGY

## 2022-11-07 PROCEDURE — 82746 ASSAY OF FOLIC ACID SERUM: CPT | Performed by: CLINICAL NURSE SPECIALIST

## 2022-11-07 PROCEDURE — 93880 EXTRACRANIAL BILAT STUDY: CPT

## 2022-11-07 PROCEDURE — 95819 EEG AWAKE AND ASLEEP: CPT | Performed by: PSYCHIATRY & NEUROLOGY

## 2022-11-07 PROCEDURE — 25010000002 CEFTRIAXONE PER 250 MG: Performed by: FAMILY MEDICINE

## 2022-11-07 PROCEDURE — 0 GADOBENATE DIMEGLUMINE 529 MG/ML SOLUTION: Performed by: FAMILY MEDICINE

## 2022-11-07 PROCEDURE — 93306 TTE W/DOPPLER COMPLETE: CPT | Performed by: INTERNAL MEDICINE

## 2022-11-07 RX ORDER — ATORVASTATIN CALCIUM 20 MG/1
20 TABLET, FILM COATED ORAL NIGHTLY
Qty: 30 TABLET | Refills: 0 | Status: SHIPPED | OUTPATIENT
Start: 2022-11-07

## 2022-11-07 RX ORDER — CEFDINIR 300 MG/1
300 CAPSULE ORAL 2 TIMES DAILY
Qty: 6 CAPSULE | Refills: 0 | Status: SHIPPED | OUTPATIENT
Start: 2022-11-08 | End: 2022-11-11

## 2022-11-07 RX ADMIN — Medication 10 ML: at 09:31

## 2022-11-07 RX ADMIN — GADOBENATE DIMEGLUMINE 16 ML: 529 INJECTION, SOLUTION INTRAVENOUS at 12:45

## 2022-11-07 RX ADMIN — SODIUM CHLORIDE 1 G: 9 INJECTION, SOLUTION INTRAVENOUS at 15:43

## 2022-11-07 RX ADMIN — ENOXAPARIN SODIUM 90 MG: 100 INJECTION SUBCUTANEOUS at 09:31
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
## 2022-11-07 NOTE — PROGRESS NOTES
"Pharmacy Dosing Service  Anticoagulant  Enoxaparin    Assessment/Action/Plan:  Consult placed for Pharmacy to Dose Lovenox for Afib.  Labs from 11/6 reviewed.  Daily BMP and CBC ordered, starting tomorrow.  Continue Lovenox 1 mg/kg q 12hrs.  Pharmacy will continue to monitor and adjust as needed.     Subjective:  Brien Badillo is a 86 y.o. male on Enoxaparin 1 mg/kg SQ every 12 hours for indication of atrial fibrillation.  Objective:  [Ht: 180.3 cm (71\"); Wt: 83.9 kg (185 lb); BMI: Body mass index is 25.8 kg/m².]  Estimated Creatinine Clearance: 53.3 mL/min (by C-G formula based on SCr of 1.18 mg/dL). No results found for: DDIMER No results found for: INR, PROTIME   Lab Results   Component Value Date    HGB 11.7 (L) 11/06/2022      Lab Results   Component Value Date     11/06/2022       L Miller Goodman RPH  11/07/22 13:23 CST     "

## 2022-11-07 NOTE — CONSULTS
[facility] HEART GROUP CONSULT NOTE    Referring Provider: Dr.Khai Mukherjee    Reason for Consultation: Atrial fibrillation and    Chief Complaint   Patient presents with   • Weakness - Generalized       Subjective .     History of present illness:  Brien Badillo is a 86 y.o. male with no cardiac history who presented to the emergency department at the recommendations of his family after syncopal/near syncopal event where the patient was shaking all over.  He becomes disoriented.  He has had multiple episodes over the last 4 to 5 days and has been observed with reported blank stare, eyes rolling back in his head, and shaking of both arms.  The patient reports feeling weak, being able to hear what is going on but not being able to respond.  He denies any chest pain, chest pressure.  He has had no chest tightness.  He denies any dyspnea on exertion, shortness of breath.  He has no known cardiac history and does not follow routinely with any physicians and is on no home medications.    Upon evaluation in the emergency department he was found to be in rate controlled atrial fibrillation.  Because of this finding and his reported episodes at home cardiology was consulted.    The patient was evaluated today.  He was up at the bedside with family and friends.  He has absolutely no complaints.  He denies any palpitations, irregular heartbeats, chest pain, chest pressure, shortness of breath, dyspnea on exertion, orthopnea, PND.      He has completed EEG, MRI, echocardiogram today.    History  No past medical history on file.,   No past surgical history on file.,   No family history on file.,    ,     Medications  Current Facility-Administered Medications   Medication Dose Route Frequency Provider Last Rate Last Admin   • cefTRIAXone (ROCEPHIN) 1 g in sodium chloride 0.9 % 100 mL IVPB-VTB  1 g Intravenous Q24H Laurence Gottlieb DO       • Enoxaparin Sodium (LOVENOX) syringe 90 mg  1 mg/kg Subcutaneous Q12H Horn,  "Laurence Puentes DO   90 mg at 11/07/22 0931   • nitroglycerin (NITROSTAT) SL tablet 0.4 mg  0.4 mg Sublingual Q5 Min PRN Laurence Gottlieb DO       • ondansetron (ZOFRAN) injection 4 mg  4 mg Intravenous Q6H PRN Laurence Gottlieb DO       • Pharmacy to Dose enoxaparin (LOVENOX)   Does not apply Continuous PRN Laurence Gottlieb DO       • sodium chloride 0.9 % flush 10 mL  10 mL Intravenous PRN Bouchra Stiles, APRN       • sodium chloride 0.9 % flush 10 mL  10 mL Intravenous Q12H Laurence Gottlieb DO   10 mL at 11/07/22 0931   • sodium chloride 0.9 % flush 10 mL  10 mL Intravenous PRN Laurence Gottlieb DO           Allergies:  Patient has no known allergies.    Review of Systems  Review of Systems   Constitutional: Negative for diaphoresis, fever and malaise/fatigue.   Cardiovascular: Negative for chest pain, leg swelling, near-syncope, orthopnea, palpitations, paroxysmal nocturnal dyspnea and syncope.   Respiratory: Negative for shortness of breath and wheezing.    Hematologic/Lymphatic: Negative for bleeding problem.   Gastrointestinal: Negative for bloating, abdominal pain, nausea and vomiting.   Neurological: Positive for tremors. Negative for dizziness, focal weakness, headaches, light-headedness, loss of balance and weakness.   Psychiatric/Behavioral: Negative for altered mental status.       Objective     Physical Exam:  Patient Vitals for the past 24 hrs:   BP Temp Temp src Pulse Resp SpO2 Height Weight   11/07/22 1211 124/63 97.7 °F (36.5 °C) Oral 71 18 95 % -- --   11/07/22 0850 143/86 98.3 °F (36.8 °C) Oral 81 18 99 % -- --   11/07/22 0408 119/81 97.6 °F (36.4 °C) Oral 75 16 98 % -- --   11/06/22 2341 133/78 97.7 °F (36.5 °C) Oral 83 16 100 % -- --   11/06/22 2140 157/86 98.1 °F (36.7 °C) Oral 84 16 100 % 180.3 cm (71\") 83.9 kg (185 lb)   11/06/22 2129 139/89 98.4 °F (36.9 °C) Oral 89 16 99 % -- --   11/06/22 2101 142/91 -- -- 89 -- -- -- --   11/06/22 2001 146/95 -- -- -- -- -- -- -- " "  11/06/22 1931 129/83 -- -- 70 -- 98 % -- --   11/06/22 1916 145/85 -- -- 74 -- 99 % -- --   11/06/22 1901 113/81 -- -- 73 -- 99 % -- --   11/06/22 1831 125/78 -- -- 76 20 98 % -- --   11/06/22 1809 (!) 158/128 -- -- 90 -- -- -- --   11/06/22 1806 (!) 157/119 -- -- 88 -- -- -- --   11/06/22 1803 150/99 -- -- 87 -- -- -- --   11/06/22 1459 131/87 98.1 °F (36.7 °C) Temporal 87 18 98 % 180.3 cm (71\") 85.3 kg (188 lb)     Vitals reviewed.   Constitutional:       Appearance: Healthy appearance. Not in distress.   Pulmonary:      Effort: Pulmonary effort is normal.      Breath sounds: Normal breath sounds.   Cardiovascular:      Normal rate. Regularly irregular rhythm.   Edema:     Peripheral edema absent.   Musculoskeletal:      Cervical back: Normal range of motion and neck supple. Skin:     General: Skin is warm and dry.   Neurological:      Mental Status: Alert, oriented to person, place, and time and oriented to person, place and time.   Psychiatric:         Attention and Perception: Attention normal.         Mood and Affect: Mood normal.         Speech: Speech normal.         Behavior: Behavior normal. Behavior is cooperative.         Thought Content: Thought content normal.         Cognition and Memory: Cognition normal.         Results Review:   I reviewed the patient's new clinical results.    Lab Results (last 72 hours)     Procedure Component Value Units Date/Time    PSA Screen [[id number]]  (Normal) Collected: 11/06/22 1802    Specimen: Blood Updated: 11/07/22 1257     PSA 3.520 ng/mL     Narrative:      Results may be falsely decreased if patient taking Biotin.      Hemoglobin A1c [[id number]]  (Abnormal) Collected: 11/06/22 1802    Specimen: Blood Updated: 11/07/22 1026     Hemoglobin A1C 5.70 %     Narrative:      Hemoglobin A1C Ranges:    Increased Risk for Diabetes  5.7% to 6.4%  Diabetes                     >= 6.5%  Diabetic Goal                < 7.0%    Vitamin B12 [[id number]] Collected: 11/07/22 0955    " Specimen: Blood Updated: 11/07/22 1024    Folate [[id number]] Collected: 11/07/22 0955    Specimen: Blood Updated: 11/07/22 1024    Urine Drug Screen - Urine, Clean Catch [[id number]]  (Normal) Collected: 11/06/22 1810    Specimen: Urine, Clean Catch Updated: 11/07/22 0816     THC, Screen, Urine Negative     Phencyclidine (PCP), Urine Negative     Cocaine Screen, Urine Negative     Methamphetamine, Ur Negative     Opiate Screen Negative     Amphetamine Screen, Urine Negative     Benzodiazepine Screen, Urine Negative     Tricyclic Antidepressants Screen Negative     Methadone Screen, Urine Negative     Barbiturates Screen, Urine Negative     Oxycodone Screen, Urine Negative     Propoxyphene Screen Negative     Buprenorphine, Screen, Urine Negative    Narrative:      Cutoff For Drugs Screened:    Amphetamines               500 ng/ml  Barbiturates               200 ng/ml  Benzodiazepines            150 ng/ml  Cocaine                    150 ng/ml  Methadone                  200 ng/ml  Opiates                    100 ng/ml  Phencyclidine               25 ng/ml  THC                            50 ng/ml  Methamphetamine            500 ng/ml  Tricyclic Antidepressants  300 ng/ml  Oxycodone                  100 ng/ml  Propoxyphene               300 ng/ml  Buprenorphine               10 ng/ml    The normal value for all drugs tested is negative. This report includes unconfirmed screening results, with the cutoff values listed, to be used for medical treatment purposes only.  Unconfirmed results must not be used for non-medical purposes such as employment or legal testing.  Clinical consideration should be applied to any drug of abuse test, particularly when unconfirmed results are used.      Comprehensive Metabolic Panel [[id number]]  (Abnormal) Collected: 11/07/22 0212    Specimen: Blood Updated: 11/07/22 0319     Glucose 92 mg/dL      BUN 28 mg/dL      Creatinine 1.18 mg/dL      Sodium 140 mmol/L      Potassium 4.2 mmol/L       Chloride 105 mmol/L      CO2 27.0 mmol/L      Calcium 9.6 mg/dL      Total Protein 7.5 g/dL      Albumin 4.30 g/dL      ALT (SGPT) 16 U/L      AST (SGOT) 23 U/L      Alkaline Phosphatase 97 U/L      Total Bilirubin 1.0 mg/dL      Globulin 3.2 gm/dL      A/G Ratio 1.3 g/dL      BUN/Creatinine Ratio 23.7     Anion Gap 8.0 mmol/L      eGFR 60.1 mL/min/1.73      Comment: National Kidney Foundation and American Society of Nephrology (ASN) Task Force recommended calculation based on the Chronic Kidney Disease Epidemiology Collaboration (CKD-EPI) equation refit without adjustment for race.       Narrative:      GFR Normal >60  Chronic Kidney Disease <60  Kidney Failure <15    The GFR formula is only valid for adults with stable renal function between ages 18 and 70.    TSH [[id number]]  (Normal) Collected: 11/07/22 0212    Specimen: Blood Updated: 11/07/22 0319     TSH 2.250 uIU/mL     T4, Free [[id number]]  (Normal) Collected: 11/07/22 0212    Specimen: Blood Updated: 11/07/22 0319     Free T4 1.15 ng/dL     Narrative:      Results may be falsely increased if patient taking Biotin.      Troponin [[id number]]  (Normal) Collected: 11/06/22 2319    Specimen: Blood Updated: 11/07/22 0003     Troponin T <0.010 ng/mL     Narrative:      Troponin T Reference Range:  <= 0.03 ng/mL-   Negative for AMI  >0.03 ng/mL-     Abnormal for myocardial necrosis.  Clinicians would have to utilize clinical acumen, EKG, Troponin and serial changes to determine if it is an Acute Myocardial Infarction or myocardial injury due to an underlying chronic condition.       Results may be falsely decreased if patient taking Biotin.      Troponin [[id number]]  (Normal) Collected: 11/06/22 2242    Specimen: Blood Updated: 11/06/22 2311     Troponin T <0.010 ng/mL     Narrative:      Troponin T Reference Range:  <= 0.03 ng/mL-   Negative for AMI  >0.03 ng/mL-     Abnormal for myocardial necrosis.  Clinicians would have to utilize clinical acumen, EKG,  Troponin and serial changes to determine if it is an Acute Myocardial Infarction or myocardial injury due to an underlying chronic condition.       Results may be falsely decreased if patient taking Biotin.      Troponin [[id number]]  (Normal) Collected: 11/06/22 2112    Specimen: Blood Updated: 11/06/22 2146     Troponin T <0.010 ng/mL     Narrative:      Troponin T Reference Range:  <= 0.03 ng/mL-   Negative for AMI  >0.03 ng/mL-     Abnormal for myocardial necrosis.  Clinicians would have to utilize clinical acumen, EKG, Troponin and serial changes to determine if it is an Acute Myocardial Infarction or myocardial injury due to an underlying chronic condition.       Results may be falsely decreased if patient taking Biotin.      Blood Culture - Blood, Arm, Right [[id number]] Collected: 11/06/22 2112    Specimen: Blood from Arm, Right Updated: 11/06/22 2120    Blood Culture - Blood, Arm, Left [[id number]] Collected: 11/06/22 2105    Specimen: Blood from Arm, Left Updated: 11/06/22 2119    Lipid Panel [[id number]]  (Abnormal) Collected: 11/06/22 1802    Specimen: Blood Updated: 11/06/22 2050     Total Cholesterol 180 mg/dL      Triglycerides 58 mg/dL      HDL Cholesterol 65 mg/dL      LDL Cholesterol  104 mg/dL      VLDL Cholesterol 11 mg/dL      LDL/HDL Ratio 1.59    Narrative:      Cholesterol Reference Ranges  (U.S. Department of Health and Human Services ATP III Classifications)    Desirable          <200 mg/dL  Borderline High    200-239 mg/dL  High Risk          >240 mg/dL      Triglyceride Reference Ranges  (U.S. Department of Health and Human Services ATP III Classifications)    Normal           <150 mg/dL  Borderline High  150-199 mg/dL  High             200-499 mg/dL  Very High        >500 mg/dL    HDL Reference Ranges  (U.S. Department of Health and Human Services ATP III Classifications)    Low     <40 mg/dl (major risk factor for CHD)  High    >60 mg/dl ('negative' risk factor for CHD)        LDL Reference  Ranges  (U.S. Department of Health and Human Services ATP III Classifications)    Optimal          <100 mg/dL  Near Optimal     100-129 mg/dL  Borderline High  130-159 mg/dL  High             160-189 mg/dL  Very High        >189 mg/dL    C-reactive Protein [[id number]]  (Normal) Collected: 11/06/22 1802    Specimen: Blood Updated: 11/06/22 1851     C-Reactive Protein <0.30 mg/dL     Comprehensive Metabolic Panel [[id number]]  (Abnormal) Collected: 11/06/22 1802    Specimen: Blood Updated: 11/06/22 1848     Glucose 110 mg/dL      BUN 33 mg/dL      Creatinine 1.32 mg/dL      Sodium 138 mmol/L      Potassium 4.6 mmol/L      Chloride 103 mmol/L      CO2 27.0 mmol/L      Calcium 9.4 mg/dL      Total Protein 7.5 g/dL      Albumin 4.50 g/dL      ALT (SGPT) 14 U/L      AST (SGOT) 22 U/L      Alkaline Phosphatase 98 U/L      Total Bilirubin 0.7 mg/dL      Globulin 3.0 gm/dL      A/G Ratio 1.5 g/dL      BUN/Creatinine Ratio 25.0     Anion Gap 8.0 mmol/L      eGFR 52.5 mL/min/1.73      Comment: National Kidney Foundation and American Society of Nephrology (ASN) Task Force recommended calculation based on the Chronic Kidney Disease Epidemiology Collaboration (CKD-EPI) equation refit without adjustment for race.       Narrative:      GFR Normal >60  Chronic Kidney Disease <60  Kidney Failure <15    The GFR formula is only valid for adults with stable renal function between ages 18 and 70.    Troponin [[id number]]  (Normal) Collected: 11/06/22 1802    Specimen: Blood Updated: 11/06/22 1844     Troponin T <0.010 ng/mL     Narrative:      Troponin T Reference Range:  <= 0.03 ng/mL-   Negative for AMI  >0.03 ng/mL-     Abnormal for myocardial necrosis.  Clinicians would have to utilize clinical acumen, EKG, Troponin and serial changes to determine if it is an Acute Myocardial Infarction or myocardial injury due to an underlying chronic condition.       Results may be falsely decreased if patient taking Biotin.      Magnesium  [[id number]]  (Normal) Collected: 11/06/22 1802    Specimen: Blood Updated: 11/06/22 1842     Magnesium 2.3 mg/dL     Urinalysis With Culture If Indicated - Urine, Clean Catch [[id number]]  (Abnormal) Collected: 11/06/22 1810    Specimen: Urine, Clean Catch Updated: 11/06/22 1831     Color, UA Yellow     Appearance, UA Cloudy     pH, UA 8.0     Specific Gravity, UA 1.018     Glucose, UA Negative     Ketones, UA Negative     Bilirubin, UA Negative     Blood, UA Negative     Protein, UA Trace     Leuk Esterase, UA Large (3+)     Nitrite, UA Negative     Urobilinogen, UA 1.0 E.U./dL    Narrative:      In absence of clinical symptoms, the presence of pyuria, bacteria, and/or nitrites on the urinalysis result does not correlate with infection.    Urinalysis, Microscopic Only - Urine, Clean Catch [[id number]]  (Abnormal) Collected: 11/06/22 1810    Specimen: Urine, Clean Catch Updated: 11/06/22 1831     RBC, UA 3-5 /HPF      WBC, UA Too Numerous to Count /HPF      Bacteria, UA 4+ /HPF      Squamous Epithelial Cells, UA None Seen /HPF      Hyaline Casts, UA None Seen /LPF      Methodology Automated Microscopy    Urine Culture - Urine, Urine, Clean Catch [[id number]] Collected: 11/06/22 1810    Specimen: Urine, Clean Catch Updated: 11/06/22 1831    CBC & Differential [[id number]]  (Abnormal) Collected: 11/06/22 1802    Specimen: Blood Updated: 11/06/22 1826    Narrative:      The following orders were created for panel order CBC & Differential.  Procedure                               Abnormality         Status                     ---------                               -----------         ------                     CBC Auto Differential[[id number]]        Abnormal            Final result                 Please view results for these tests on the individual orders.    CBC Auto Differential [[id number]]  (Abnormal) Collected: 11/06/22 1802    Specimen: Blood Updated: 11/06/22 1826     WBC 7.92 10*3/mm3      RBC 3.97 10*6/mm3       Hemoglobin 11.7 g/dL      Hematocrit 36.4 %      MCV 91.7 fL      MCH 29.5 pg      MCHC 32.1 g/dL      RDW 14.9 %      RDW-SD 50.0 fl      MPV 8.9 fL      Platelets 258 10*3/mm3      Neutrophil % 72.5 %      Lymphocyte % 17.4 %      Monocyte % 8.1 %      Eosinophil % 1.1 %      Basophil % 0.6 %      Immature Grans % 0.3 %      Neutrophils, Absolute 5.74 10*3/mm3      Lymphocytes, Absolute 1.38 10*3/mm3      Monocytes, Absolute 0.64 10*3/mm3      Eosinophils, Absolute 0.09 10*3/mm3      Basophils, Absolute 0.05 10*3/mm3      Immature Grans, Absolute 0.02 10*3/mm3      nRBC 0.0 /100 WBC     Lactic Acid, Plasma [[id number]]  (Normal) Collected: 11/06/22 1753    Specimen: Blood Updated: 11/06/22 1825     Lactate 1.1 mmol/L           No results found for: ECHOEFEST    Imaging Results (Last 72 Hours)     Procedure Component Value Units Date/Time    MRI Brain With & Without Contrast [[id number]] Collected: 11/07/22 1308     Updated: 11/07/22 1318    Narrative:      Indication: A. Fib, altered mental status, suspected stroke        Technique: Multisequence, multiplanar MRI of the brain with and without  contrast. 16 cc MultiHance IV contrast.     Comparison: CT scan dated 11/6/2022     Findings:      No diffusion signal abnormality. Mild chronic small vessel ischemic  change. No intra-axial or extra-axial hemorrhage. No intracranial mass  lesion or pathologic enhancement. The ventricles, cortical sulci and  basal cisterns are symmetric and age appropriate. Posterior fossa  structures are unremarkable. Pituitary gland and sella are unremarkable.  The major intracranial flow-voids are preserved. Orbital contents are  unremarkable. The paranasal sinuses are clear. Mastoid air cells are  clear. Normal bone marrow signal.        Impression:      Impression:     1. No acute intracranial process. In particular, no acute ischemia.     This report was finalized on 11/07/2022 13:14 by Dr Malik Brand, .    US Carotid Bilateral  "[[id number]] Collected: 11/07/22 1300     Updated: 11/07/22 1303    Narrative:      History: Carotid occlusive disease       Impression:      Impression:  1. There is less than 50% stenosis of the right internal carotid artery.  2. There is less than 50% stenosis of the left internal carotid artery.  3. Antegrade flow is demonstrated in bilateral vertebral arteries.     Comments: Bilateral carotid vertebral arterial duplex scan was  performed.     Grayscale imaging shows intimal thickening and calcified elements at the  carotid bifurcation. The right internal carotid artery peak systolic  velocity is 77.6 cm/sec. The end-diastolic velocity is 30.4 cm/sec. The  right ICA/CCA ratio is approximately 1.3 . These findings correlate with  less than 50% stenosis of the right internal carotid artery.     Grayscale imaging shows intimal thickening and calcified elements at the  carotid bifurcation. The left internal carotid artery peak systolic  velocity is 70.7 cm/sec. The end-diastolic velocity is 32.7 cm/sec. The  left ICA/CCA ratio is approximately 0.8 . These findings correlate with  less than 50% stenosis of the left internal carotid artery.     Antegrade flow is demonstrated in bilateral vertebral arteries.     This report was finalized on 11/07/2022 13:00 by Dr. Jonny Silverman MD.    CT Head Without Contrast [[id number]] Collected: 11/06/22 1843     Updated: 11/06/22 1847    Narrative:      EXAMINATION:  CT HEAD WO CONTRAST-  11/6/2022 6:38 PM CST     HISTORY: AMS, \"shaking\", near syncope.     TECHNIQUE: Multiple axial images were obtained through the brain without  contrast infusion. Multiplanar images were reconstructed.     DLP: 668 mGy-cm. Automated dosage reduction technique was utilized to  reduce patient dosage.     COMPARISON: No comparison study.     FINDINGS: There are no hemorrhage, edema or mass effect. There is  bilateral basal ganglia calcification. There is minimal atrophy, age  appropriate. There is " minimal low density in the hemispheric white  matter. The ventricular system is nondilated. The visualized paranasal  sinuses are clear. The mastoid air cells are clear. No calvarial  fracture is seen.       Impression:      1. No hemorrhage, edema or mass effect. No acute findings.  2. Minimal age-appropriate atrophy and chronic small vessel ischemic  white matter disease.     The full report of this exam was immediately signed and available to the  emergency room. The patient is currently in the emergency room.     This report was finalized on 11/06/2022 18:44 by Dr. Cal Wilder MD.    XR Chest 1 View [[id number]] Collected: 11/06/22 1736     Updated: 11/06/22 1740    Narrative:      EXAMINATION:  XR CHEST 1 VW-  11/6/2022 5:30 PM CST     HISTORY: Altered mental status.     COMPARISON: No comparison study.     TECHNIQUE: Single view AP image.     FINDINGS:  Apical lordotic projection. No focal infiltrate. There is  mild bronchial wall thickening. Heart size is upper limits of normal.  There are degenerative changes of the visualized spine.       Impression:      1. No acute appearing infiltrate.  2. Bronchial wall thickening, likely chronic.  3. Heart size is borderline.        This report was finalized on 11/06/2022 17:37 by Dr. Cal Wilder MD.        Results for orders placed during the hospital encounter of 11/06/22    Adult Transthoracic Echo Complete W/ Cont if Necessary Per Protocol    Interpretation Summary  •  Left ventricular systolic function is normal. Left ventricular ejection fraction appears to be 61 - 65%.  •  Left ventricular wall thickness is consistent with mild concentric hypertrophy.  •  The right ventricular cavity is mildly dilated. Borderline low right ventricular systolic function noted.  •  Biatrial enlargement is noted.  •  Mild mitral valve regurgitation is present.      Assessment/plan  1.  Atrial fibrillation: New diagnosis this admission.  Presented with controlled rates.  Is  completely asymptomatic to the finding.  Likely longstanding persistent rhythm given findings of biatrial enlargement on echocardiogram.  Recommend use of anticoagulation for stroke risk reduction.  Initiate Eliquis 5 mg twice daily.  I have encouraged the patient to apply for Medicare part D during open enrollment as he currently does not have any prescription coverage and has not required prescription medications in the past.  His rates have been well controlled and does not appear to require rate controlling medications.  The patient will be discharged home with use of the 30-day voucher and has been provided samples with recommendations to enroll for insurance for 2023 coverage.    2.  UTI: Primary treating    3.  Renal insufficiency: Resolved    4.  Essential tremor: Chronic.      5.  Syncope/tremors: The patient presented with syncopal episode and seizure-like activity.  EEG has been normal.  MRI is normal.  He was evaluated by neurology.        I recommend establishing care with a primary care physician.  We can plan for discharge follow-up in our office.  Recommend follow-up in 4 weeks to evaluate for any bleeding issues after starting anticoagulation.      Thank you for the consultation, cardiology will gladly continue to follow.     Electronically signed by ENA Aguirre, 11/07/22, 2:07 PM CST.      Please note this cardiology consultation note is the result of a face to face consultation with the patient, in addition to reviewing medical records at length by myself, ENA Aguirre.       Time: 60 minutes

## 2022-11-07 NOTE — CASE MANAGEMENT/SOCIAL WORK
Discharge Planning Assessment  [facility]     Patient Name: Brien Badillo  MRN: [id number]  Today's Date: 11/7/2022    Admit Date: 11/6/2022        Discharge Needs Assessment     Row Name 11/07/22 0838       Living Environment    People in Home facility resident    Current Living Arrangements extended care facility       Discharge Needs Assessment    Current Outpatient/Agency/Support Group skilled nursing facility    Discharge Coordination/Progress spoke to Lili with [facility]; patient is a skilled resident without a bedhold but can return to their facility at [place]               Discharge Plan    No documentation.               Continued Care and Services - Admitted Since 11/6/2022    Coordination has not been started for this encounter.          Demographic Summary    No documentation.                Functional Status    No documentation.                Psychosocial    No documentation.                Abuse/Neglect    No documentation.                Legal    No documentation.                Substance Abuse    No documentation.                Patient Forms    No documentation.                   Salma Corcoran RN
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
## 2022-11-07 NOTE — CASE MANAGEMENT/SOCIAL WORK
Discharge Planning Assessment  [facility]     Patient Name: Brien Badillo  MRN: [id number]  Today's Date: 11/7/2022    Admit Date: 11/6/2022        Discharge Needs Assessment     Row Name 11/07/22 0859       Living Environment    People in Home spouse    Name(s) of People in Home Sonja    Current Living Arrangements home    Primary Care Provided by spouse/significant other    Provides Primary Care For no one    Family Caregiver if Needed spouse    Family Caregiver Names Sonja    Able to Return to Prior Arrangements yes       Resource/Environmental Concerns    Resource/Environmental Concerns none       Transition Planning    Transportation Anticipated family or friend will provide       Discharge Needs Assessment    Readmission Within the Last 30 Days no previous admission in last 30 days    Equipment Currently Used at Home none    Concerns to be Addressed no discharge needs identified    Equipment Needed After Discharge none    Discharge Coordination/Progress spoke to patient who lives with spouse; has RX coverage and PCP; independent at home prior to illness will follow for DC needs    Row Name 11/07/22 0838       Living Environment    People in Home facility resident    Current Living Arrangements extended care facility       Discharge Needs Assessment    Current Outpatient/Agency/Support Group skilled nursing facility    Discharge Coordination/Progress spoke to Lili with [facility]; patient is a skilled resident without a bedhold but can return to their facility at [place]               Discharge Plan    No documentation.               Continued Care and Services - Admitted Since 11/6/2022    Coordination has not been started for this encounter.          Demographic Summary    No documentation.                Functional Status    No documentation.                Psychosocial    No documentation.                Abuse/Neglect    No documentation.                Legal    No documentation.                 Substance Abuse    No documentation.                Patient Forms    No documentation.                   Salma Corcoran RN

## 2022-11-07 NOTE — H&P
[facility] Medicine Services  HISTORY AND PHYSICAL    Date of Admission: 2022  Primary Care Physician: Provider, No Known    Subjective     Chief Complaint: Episodes of altered mental status/syncope    History of Present Illness  Patient relates that for the last 36 hours has been having episodes of where he goes blank, his eyes rolled back in his head and he started shaking.  They are short-lived 30 seconds to a minute.  Some of these episodes have been witnessed by his wife and daughter.  Patient notes that he is nauseated after these occur.  He feels he has been in his usual state of health up to this point.  Patient does not have a primary care physician, has not seen a doctor in years.  He takes no medications.  He has noted an irregular heart rate for approximately 1 year.  And upon specific questioning he does admit to some urinary burning for 2 to 3 months.  He does get up 4-5 times at night to urinate  He does not feel that his stream is as strong as it used to be.  He does not feel it is particularly painful.    Review of Systems   Constitutional: Negative.    HENT: Negative.    Eyes: Negative.    Respiratory: Negative.    Cardiovascular: Negative.    Gastrointestinal: Positive for nausea.   Endocrine: Negative.    Genitourinary:        Nocturia   Musculoskeletal: Negative.    Skin: Negative.    Neurological: Positive for tremors and syncope.   Hematological: Negative.    Psychiatric/Behavioral: Negative.         .    Past Medical History: Irregular heart rate  Past Surgical History: Patient notes he had a surgery back when he is around 4 years old he does not remember what it was.  Other than that he has no surgical history.  Social History:   Patient is .  Lives at home.  He does not smoke nor does he do drugs.  He has a very rare mixed drink.  He is very active.      Family History: Parents are .  Mother  at 94 from old age.  Father had some  "form of cancer.    Allergies:  No Known Allergies    Medications:  Patient takes no prescription medications        Objective     Vital Signs: /78   Pulse 76   Temp 98.1 °F (36.7 °C) (Temporal)   Resp 20   Ht 180.3 cm (71\")   Wt 85.3 kg (188 lb)   SpO2 98%   BMI 26.22 kg/m²   Physical Exam  Vitals and nursing note reviewed.   Constitutional:       General: He is not in acute distress.     Appearance: Normal appearance. He is normal weight. He is not ill-appearing.   HENT:      Head: Normocephalic and atraumatic.      Right Ear: External ear normal.      Left Ear: External ear normal.      Nose: Nose normal.      Mouth/Throat:      Mouth: Mucous membranes are moist.   Eyes:      Extraocular Movements: Extraocular movements intact.      Conjunctiva/sclera: Conjunctivae normal.      Pupils: Pupils are equal, round, and reactive to light.   Neck:      Vascular: No carotid bruit.   Cardiovascular:      Rate and Rhythm: Normal rate. Rhythm irregular.      Pulses: Normal pulses.      Heart sounds: No murmur heard.    No friction rub. No gallop.   Pulmonary:      Effort: Pulmonary effort is normal.      Breath sounds: Normal breath sounds.   Abdominal:      General: Bowel sounds are normal. There is no distension.      Palpations: Abdomen is soft.      Tenderness: There is no abdominal tenderness. There is no guarding.   Musculoskeletal:         General: No swelling or tenderness. Normal range of motion.      Cervical back: Normal range of motion and neck supple.      Right lower leg: No edema.      Left lower leg: No edema.   Lymphadenopathy:      Cervical: No cervical adenopathy.   Skin:     General: Skin is warm and dry.      Capillary Refill: Capillary refill takes less than 2 seconds.      Comments: Fungal fingernails   Neurological:      General: No focal deficit present.      Mental Status: He is alert and oriented to person, place, and time.      Cranial Nerves: No cranial nerve deficit.      Motor: No " weakness.      Coordination: Coordination normal.   Psychiatric:         Mood and Affect: Mood normal.         Behavior: Behavior normal.         Thought Content: Thought content normal.         Judgment: Judgment normal.              Results Reviewed:  Lab Results (last 24 hours)     Procedure Component Value Units Date/Time    PSA Screen [[id number]] Collected: 11/06/22 1802    Specimen: Blood Updated: 11/06/22 2035    Lipid Panel [[id number]] Collected: 11/06/22 1802    Specimen: Blood Updated: 11/06/22 2035    C-reactive Protein [[id number]]  (Normal) Collected: 11/06/22 1802    Specimen: Blood Updated: 11/06/22 1851     C-Reactive Protein <0.30 mg/dL     Comprehensive Metabolic Panel [[id number]]  (Abnormal) Collected: 11/06/22 1802    Specimen: Blood Updated: 11/06/22 1848     Glucose 110 mg/dL      BUN 33 mg/dL      Creatinine 1.32 mg/dL      Sodium 138 mmol/L      Potassium 4.6 mmol/L      Chloride 103 mmol/L      CO2 27.0 mmol/L      Calcium 9.4 mg/dL      Total Protein 7.5 g/dL      Albumin 4.50 g/dL      ALT (SGPT) 14 U/L      AST (SGOT) 22 U/L      Alkaline Phosphatase 98 U/L      Total Bilirubin 0.7 mg/dL      Globulin 3.0 gm/dL      A/G Ratio 1.5 g/dL      BUN/Creatinine Ratio 25.0     Anion Gap 8.0 mmol/L      eGFR 52.5 mL/min/1.73      Comment: National Kidney Foundation and American Society of Nephrology (ASN) Task Force recommended calculation based on the Chronic Kidney Disease Epidemiology Collaboration (CKD-EPI) equation refit without adjustment for race.       Narrative:      GFR Normal >60  Chronic Kidney Disease <60  Kidney Failure <15    The GFR formula is only valid for adults with stable renal function between ages 18 and 70.    Troponin [[id number]]  (Normal) Collected: 11/06/22 1802    Specimen: Blood Updated: 11/06/22 1844     Troponin T <0.010 ng/mL     Narrative:      Troponin T Reference Range:  <= 0.03 ng/mL-   Negative for AMI  >0.03 ng/mL-     Abnormal for myocardial necrosis.   Clinicians would have to utilize clinical acumen, EKG, Troponin and serial changes to determine if it is an Acute Myocardial Infarction or myocardial injury due to an underlying chronic condition.       Results may be falsely decreased if patient taking Biotin.      Magnesium [[id number]]  (Normal) Collected: 11/06/22 1802    Specimen: Blood Updated: 11/06/22 1842     Magnesium 2.3 mg/dL     Urinalysis With Culture If Indicated - Urine, Clean Catch [[id number]]  (Abnormal) Collected: 11/06/22 1810    Specimen: Urine, Clean Catch Updated: 11/06/22 1831     Color, UA Yellow     Appearance, UA Cloudy     pH, UA 8.0     Specific Gravity, UA 1.018     Glucose, UA Negative     Ketones, UA Negative     Bilirubin, UA Negative     Blood, UA Negative     Protein, UA Trace     Leuk Esterase, UA Large (3+)     Nitrite, UA Negative     Urobilinogen, UA 1.0 E.U./dL    Narrative:      In absence of clinical symptoms, the presence of pyuria, bacteria, and/or nitrites on the urinalysis result does not correlate with infection.    Urinalysis, Microscopic Only - Urine, Clean Catch [[id number]]  (Abnormal) Collected: 11/06/22 1810    Specimen: Urine, Clean Catch Updated: 11/06/22 1831     RBC, UA 3-5 /HPF      WBC, UA Too Numerous to Count /HPF      Bacteria, UA 4+ /HPF      Squamous Epithelial Cells, UA None Seen /HPF      Hyaline Casts, UA None Seen /LPF      Methodology Automated Microscopy    Urine Culture - Urine, Urine, Clean Catch [[id number]] Collected: 11/06/22 1810    Specimen: Urine, Clean Catch Updated: 11/06/22 1831    CBC & Differential [[id number]]  (Abnormal) Collected: 11/06/22 1802    Specimen: Blood Updated: 11/06/22 1826    Narrative:      The following orders were created for panel order CBC & Differential.  Procedure                               Abnormality         Status                     ---------                               -----------         ------                     CBC Auto Differential[[id number]]         "Abnormal            Final result                 Please view results for these tests on the individual orders.    CBC Auto Differential [[id number]]  (Abnormal) Collected: 11/06/22 1802    Specimen: Blood Updated: 11/06/22 1826     WBC 7.92 10*3/mm3      RBC 3.97 10*6/mm3      Hemoglobin 11.7 g/dL      Hematocrit 36.4 %      MCV 91.7 fL      MCH 29.5 pg      MCHC 32.1 g/dL      RDW 14.9 %      RDW-SD 50.0 fl      MPV 8.9 fL      Platelets 258 10*3/mm3      Neutrophil % 72.5 %      Lymphocyte % 17.4 %      Monocyte % 8.1 %      Eosinophil % 1.1 %      Basophil % 0.6 %      Immature Grans % 0.3 %      Neutrophils, Absolute 5.74 10*3/mm3      Lymphocytes, Absolute 1.38 10*3/mm3      Monocytes, Absolute 0.64 10*3/mm3      Eosinophils, Absolute 0.09 10*3/mm3      Basophils, Absolute 0.05 10*3/mm3      Immature Grans, Absolute 0.02 10*3/mm3      nRBC 0.0 /100 WBC     Lactic Acid, Plasma [[id number]]  (Normal) Collected: 11/06/22 1753    Specimen: Blood Updated: 11/06/22 1825     Lactate 1.1 mmol/L         Imaging Results (Last 24 Hours)     Procedure Component Value Units Date/Time    CT Head Without Contrast [[id number]] Collected: 11/06/22 1843     Updated: 11/06/22 1847    Narrative:      EXAMINATION:  CT HEAD WO CONTRAST-  11/6/2022 6:38 PM CST     HISTORY: AMS, \"shaking\", near syncope.     TECHNIQUE: Multiple axial images were obtained through the brain without  contrast infusion. Multiplanar images were reconstructed.     DLP: 668 mGy-cm. Automated dosage reduction technique was utilized to  reduce patient dosage.     COMPARISON: No comparison study.     FINDINGS: There are no hemorrhage, edema or mass effect. There is  bilateral basal ganglia calcification. There is minimal atrophy, age  appropriate. There is minimal low density in the hemispheric white  matter. The ventricular system is nondilated. The visualized paranasal  sinuses are clear. The mastoid air cells are clear. No calvarial  fracture is seen.       " Impression:      1. No hemorrhage, edema or mass effect. No acute findings.  2. Minimal age-appropriate atrophy and chronic small vessel ischemic  white matter disease.     The full report of this exam was immediately signed and available to the  emergency room. The patient is currently in the emergency room.     This report was finalized on 11/06/2022 18:44 by Dr. Cal Wilder MD.    XR Chest 1 View [[id number]] Collected: 11/06/22 1736     Updated: 11/06/22 1740    Narrative:      EXAMINATION:  XR CHEST 1 VW-  11/6/2022 5:30 PM CST     HISTORY: Altered mental status.     COMPARISON: No comparison study.     TECHNIQUE: Single view AP image.     FINDINGS:  Apical lordotic projection. No focal infiltrate. There is  mild bronchial wall thickening. Heart size is upper limits of normal.  There are degenerative changes of the visualized spine.       Impression:      1. No acute appearing infiltrate.  2. Bronchial wall thickening, likely chronic.  3. Heart size is borderline.        This report was finalized on 11/06/2022 17:37 by Dr. Cal Wilder MD.        I have personally reviewed and interpreted the radiology studies and ECG obtained at time of admission.     Assessment / Plan     Assessment:   Active Hospital Problems    Diagnosis    • **Altered mental status, unspecified altered mental status type    • Longstanding persistent atrial fibrillation (HCC)    • Acute UTI (urinary tract infection)    • Renal insufficiency    • Anemia, unspecified    • Elevated blood sugar      Plan:      Patient will be admitted to the hospital.  He will be placed on telemetry.  Lovenox will be started to be dosed and monitored by the pharmacy every 12 hours.  Carotid Dopplers will be obtained as well as an echocardiogram  Rocephin 1 g IV every 24 hours for treatment of the urinary tract infection  Additional laboratory data will be obtained to include his PSA, TSH T4, CMP.  We will monitor his troponin also.  Repeat EKG in  AM.      Code Status/Advanced Care Plan: DNR/DNI    The patient's surrogate decision maker is Patricia Badillo, daughter.     I discussed my findings and recommendations with the patient, wife, daughter.    Estimated length of stay is 1 to 2 days.     The patient was seen and examined by me on 11/6/2022 at 1930.    Electronically signed by Laurence Gottlieb DO, 11/06/22, 20:48 CST.
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
## 2022-11-07 NOTE — PROGRESS NOTES
"Action/Assessment/Plan:  Initiated Lovenox 1 mg/kg SQ every 12 hours for the treatment of atrial fibrillation.  Labs reviewed.  Pharmacy will continue to follow.     Brien Badillo is a 86 y.o. male [Ht: 180.3 cm (71\"); Wt: 85.3 kg (188 lb)] on Lovenox 90 mg SQ Q12H for indication of atrial fibrillation.    Body mass index is 26.22 kg/m².  Estimated Creatinine Clearance: 48.5 mL/min (A) (by C-G formula based on SCr of 1.32 mg/dL (H)).  No results found for: DDIMER  Lab Results   Component Value Date    HGB 11.7 (L) 11/06/2022     Lab Results   Component Value Date     11/06/2022     No results found for: INR, PROTIME    "

## 2022-11-07 NOTE — PLAN OF CARE
Goal Outcome Evaluation:  Plan of Care Reviewed With: patient        Progress: no change  Outcome Evaluation: NIH=0.  Slight tremor in bilateral hands.  Pt states that has been present for multiple years.  No neuro deficits.  Gait steady.  A-fib 70s on tele.  No c/o pain.

## 2022-11-09 LAB — BACTERIA SPEC AEROBE CULT: NORMAL

## 2022-11-11 LAB
BACTERIA SPEC AEROBE CULT: NORMAL
BACTERIA SPEC AEROBE CULT: NORMAL

## 2023-09-02 ENCOUNTER — NURSE TRIAGE (OUTPATIENT)
Dept: CALL CENTER | Facility: HOSPITAL | Age: 88
End: 2023-09-02
Payer: MEDICARE

## 2023-09-02 NOTE — TELEPHONE ENCOUNTER
has been having spells where he has tingling all over which lasts 2-3 minutes, must close eyes and sit still, then passes but started last night and then gets very week and dizzy.  Must sit and close eyes, instructed patients wife to call 911 per protocol, wife verbalized understanding.

## 2023-09-02 NOTE — TELEPHONE ENCOUNTER
"Reason for Disposition   [1] Numbness (i.e., loss of sensation) of the face, arm / hand, or leg / foot on one side of the body AND [2] sudden onset AND [3] present now    Additional Information   Negative: [1] SEVERE weakness (i.e., unable to walk or barely able to walk, requires support) AND [2] new-onset or worsening   Negative: [1] Weakness (i.e., paralysis, loss of muscle strength) of the face, arm / hand, or leg / foot on one side of the body AND [2] sudden onset AND [3] present now  (Exception: Bell's palsy suspected [i.e., weakness only on one side of the face, developing over hours to days, no other symptoms].)    Answer Assessment - Initial Assessment Questions  1. SYMPTOM: \"What is the main symptom you are concerned about?\" (e.g., weakness, numbness)      Tingling, weak and closes his high  2. ONSET: \"When did this start?\" (minutes, hours, days; while sleeping)      Started last night, last minute or two  3. LAST NORMAL: \"When was the last time you (the patient) were normal (no symptoms)?\"      Two days  4. PATTERN \"Does this come and go, or has it been constant since it started?\"  \"Is it present now?\"      Comes and go  5. CARDIAC SYMPTOMS: \"Have you had any of the following symptoms: chest pain, difficulty breathing, palpitations?\"      denies  6. NEUROLOGIC SYMPTOMS: \"Have you had any of the following symptoms: headache, dizziness, vision loss, double vision, changes in speech, unsteady on your feet?\"      weak  7. OTHER SYMPTOMS: \"Do you have any other symptoms?\"      Weak, tingling  8. PREGNANCY: \"Is there any chance you are pregnant?\" \"When was your last menstrual period?\"      na    Protocols used: Neurologic Deficit-ADULT-AH    "

## 2025-04-05 ENCOUNTER — HOSPITAL ENCOUNTER (EMERGENCY)
Facility: HOSPITAL | Age: OVER 89
Discharge: HOME | End: 2025-04-05
Payer: MEDICARE

## 2025-04-05 ENCOUNTER — APPOINTMENT (OUTPATIENT)
Dept: CT IMAGING | Facility: HOSPITAL | Age: OVER 89
End: 2025-04-05
Payer: MEDICARE

## 2025-04-05 VITALS
DIASTOLIC BLOOD PRESSURE: 77 MMHG | RESPIRATION RATE: 16 BRPM | SYSTOLIC BLOOD PRESSURE: 115 MMHG | HEIGHT: 71 IN | WEIGHT: 181.3 LBS | TEMPERATURE: 99 F | BODY MASS INDEX: 25.38 KG/M2 | OXYGEN SATURATION: 97 % | HEART RATE: 85 BPM

## 2025-04-05 DIAGNOSIS — R33.9 URINARY RETENTION: Primary | ICD-10-CM

## 2025-04-05 DIAGNOSIS — N39.0 URINARY TRACT INFECTION WITHOUT HEMATURIA, SITE UNSPECIFIED: ICD-10-CM

## 2025-04-05 DIAGNOSIS — N40.0 ENLARGED PROSTATE: ICD-10-CM

## 2025-04-05 DIAGNOSIS — N28.89 DILATION OF RENAL PELVIS: ICD-10-CM

## 2025-04-05 LAB
ACANTHOCYTES BLD QL SMEAR: ABNORMAL
ALBUMIN SERPL-MCNC: 3.7 G/DL (ref 3.5–5.2)
ALBUMIN/GLOB SERPL: 1 G/DL
ALP SERPL-CCNC: 145 U/L (ref 39–117)
ALT SERPL W P-5'-P-CCNC: 18 U/L (ref 1–41)
ANION GAP SERPL CALCULATED.3IONS-SCNC: 15 MMOL/L (ref 5–15)
ANISOCYTOSIS BLD QL: ABNORMAL
AST SERPL-CCNC: 21 U/L (ref 1–40)
BACTERIA UR QL AUTO: ABNORMAL /HPF
BASOPHILS # BLD MANUAL: 0 10*3/MM3 (ref 0–0.2)
BASOPHILS NFR BLD MANUAL: 0 % (ref 0–1.5)
BILIRUB SERPL-MCNC: 1.1 MG/DL (ref 0–1.2)
BILIRUB UR QL STRIP: NEGATIVE
BUN SERPL-MCNC: 24 MG/DL (ref 8–23)
BUN/CREAT SERPL: 25 (ref 7–25)
BURR CELLS BLD QL SMEAR: ABNORMAL
CALCIUM SPEC-SCNC: 9.1 MG/DL (ref 8.6–10.5)
CHLORIDE SERPL-SCNC: 98 MMOL/L (ref 98–107)
CLARITY UR: ABNORMAL
CO2 SERPL-SCNC: 21 MMOL/L (ref 22–29)
COLOR UR: ABNORMAL
CREAT SERPL-MCNC: 0.96 MG/DL (ref 0.76–1.27)
DEPRECATED RDW RBC AUTO: 47.9 FL (ref 37–54)
EGFRCR SERPLBLD CKD-EPI 2021: 75.6 ML/MIN/1.73
ELLIPTOCYTES BLD QL SMEAR: ABNORMAL
EOSINOPHIL # BLD MANUAL: 0 10*3/MM3 (ref 0–0.4)
EOSINOPHIL NFR BLD MANUAL: 0 % (ref 0.3–6.2)
ERYTHROCYTE [DISTWIDTH] IN BLOOD BY AUTOMATED COUNT: 14.6 % (ref 12.3–15.4)
GLOBULIN UR ELPH-MCNC: 3.7 GM/DL
GLUCOSE SERPL-MCNC: 123 MG/DL (ref 65–99)
GLUCOSE UR STRIP-MCNC: NEGATIVE MG/DL
HCT VFR BLD AUTO: 37.4 % (ref 37.5–51)
HGB BLD-MCNC: 12.3 G/DL (ref 13–17.7)
HGB UR QL STRIP.AUTO: ABNORMAL
HYALINE CASTS UR QL AUTO: ABNORMAL /LPF
KETONES UR QL STRIP: NEGATIVE
LEUKOCYTE ESTERASE UR QL STRIP.AUTO: ABNORMAL
LIPASE SERPL-CCNC: 26 U/L (ref 13–60)
LYMPHOCYTES # BLD MANUAL: 0.81 10*3/MM3 (ref 0.7–3.1)
LYMPHOCYTES NFR BLD MANUAL: 4 % (ref 5–12)
MCH RBC QN AUTO: 29.6 PG (ref 26.6–33)
MCHC RBC AUTO-ENTMCNC: 32.9 G/DL (ref 31.5–35.7)
MCV RBC AUTO: 90.1 FL (ref 79–97)
MONOCYTES # BLD: 0.46 10*3/MM3 (ref 0.1–0.9)
NEUTROPHILS # BLD AUTO: 10.33 10*3/MM3 (ref 1.7–7)
NEUTROPHILS NFR BLD MANUAL: 86.9 % (ref 42.7–76)
NEUTS BAND NFR BLD MANUAL: 2 % (ref 0–5)
NEUTS VAC BLD QL SMEAR: ABNORMAL
NITRITE UR QL STRIP: NEGATIVE
PH UR STRIP.AUTO: 6.5 [PH] (ref 5–8)
PLAT MORPH BLD: NORMAL
PLATELET # BLD AUTO: 302 10*3/MM3 (ref 140–450)
PMV BLD AUTO: 9.1 FL (ref 6–12)
POIKILOCYTOSIS BLD QL SMEAR: ABNORMAL
POLYCHROMASIA BLD QL SMEAR: ABNORMAL
POTASSIUM SERPL-SCNC: 4.3 MMOL/L (ref 3.5–5.2)
PROCALCITONIN SERPL-MCNC: 0.13 NG/ML (ref 0–0.25)
PROT SERPL-MCNC: 7.4 G/DL (ref 6–8.5)
PROT UR QL STRIP: ABNORMAL
RBC # BLD AUTO: 4.15 10*6/MM3 (ref 4.14–5.8)
RBC # UR STRIP: ABNORMAL /HPF
REF LAB TEST METHOD: ABNORMAL
SODIUM SERPL-SCNC: 134 MMOL/L (ref 136–145)
SP GR UR STRIP: 1.01 (ref 1–1.03)
SQUAMOUS #/AREA URNS HPF: ABNORMAL /HPF
UROBILINOGEN UR QL STRIP: ABNORMAL
VARIANT LYMPHS NFR BLD MANUAL: 3 % (ref 19.6–45.3)
VARIANT LYMPHS NFR BLD MANUAL: 4 % (ref 0–5)
WBC # UR STRIP: ABNORMAL /HPF
WBC NRBC COR # BLD AUTO: 11.62 10*3/MM3 (ref 3.4–10.8)

## 2025-04-05 PROCEDURE — 85007 BL SMEAR W/DIFF WBC COUNT: CPT | Performed by: PHYSICIAN ASSISTANT

## 2025-04-05 PROCEDURE — 51798 US URINE CAPACITY MEASURE: CPT

## 2025-04-05 PROCEDURE — 83690 ASSAY OF LIPASE: CPT | Performed by: PHYSICIAN ASSISTANT

## 2025-04-05 PROCEDURE — 87186 SC STD MICRODIL/AGAR DIL: CPT | Performed by: PHYSICIAN ASSISTANT

## 2025-04-05 PROCEDURE — 74177 CT ABD & PELVIS W/CONTRAST: CPT

## 2025-04-05 PROCEDURE — 25010000002 CEFTRIAXONE PER 250 MG: Performed by: PHYSICIAN ASSISTANT

## 2025-04-05 PROCEDURE — 80053 COMPREHEN METABOLIC PANEL: CPT | Performed by: PHYSICIAN ASSISTANT

## 2025-04-05 PROCEDURE — 84145 PROCALCITONIN (PCT): CPT | Performed by: PHYSICIAN ASSISTANT

## 2025-04-05 PROCEDURE — 99285 EMERGENCY DEPT VISIT HI MDM: CPT

## 2025-04-05 PROCEDURE — 25510000001 IOPAMIDOL 61 % SOLUTION: Performed by: PHYSICIAN ASSISTANT

## 2025-04-05 PROCEDURE — 85025 COMPLETE CBC W/AUTO DIFF WBC: CPT | Performed by: PHYSICIAN ASSISTANT

## 2025-04-05 PROCEDURE — 96365 THER/PROPH/DIAG IV INF INIT: CPT

## 2025-04-05 PROCEDURE — 87077 CULTURE AEROBIC IDENTIFY: CPT | Performed by: PHYSICIAN ASSISTANT

## 2025-04-05 PROCEDURE — 51702 INSERT TEMP BLADDER CATH: CPT

## 2025-04-05 PROCEDURE — 87086 URINE CULTURE/COLONY COUNT: CPT | Performed by: PHYSICIAN ASSISTANT

## 2025-04-05 PROCEDURE — 87088 URINE BACTERIA CULTURE: CPT | Performed by: PHYSICIAN ASSISTANT

## 2025-04-05 PROCEDURE — 81001 URINALYSIS AUTO W/SCOPE: CPT | Performed by: PHYSICIAN ASSISTANT

## 2025-04-05 RX ORDER — CEFDINIR 300 MG/1
300 CAPSULE ORAL 2 TIMES DAILY
Qty: 14 CAPSULE | Refills: 0 | Status: SHIPPED | OUTPATIENT
Start: 2025-04-05 | End: 2025-04-12

## 2025-04-05 RX ORDER — IOPAMIDOL 612 MG/ML
100 INJECTION, SOLUTION INTRAVASCULAR
Status: COMPLETED | OUTPATIENT
Start: 2025-04-05 | End: 2025-04-05

## 2025-04-05 RX ORDER — SODIUM CHLORIDE 0.9 % (FLUSH) 0.9 %
10 SYRINGE (ML) INJECTION AS NEEDED
Status: DISCONTINUED | OUTPATIENT
Start: 2025-04-05 | End: 2025-04-06 | Stop reason: HOSPADM

## 2025-04-05 RX ADMIN — SODIUM CHLORIDE 1000 MG: 900 INJECTION INTRAVENOUS at 20:49

## 2025-04-05 RX ADMIN — IOPAMIDOL 100 ML: 612 INJECTION, SOLUTION INTRAVENOUS at 19:48

## 2025-04-05 NOTE — ED PROVIDER NOTES
Subjective   History of Present Illness    Patient is an 89-year-old male presenting to ED with urinary retention.  PMH significant for high cholesterol, long-term use Eliquis.  Patient states that yesterday he had gradually decreasing urinary output for which at 4 AM he last was able to get any urine.  Patient states since that time he has not been able to produce any urine and feels as though his suprapubic abdomen is increasing in size and distention as well as discomfort.  Patient denies any flank pain, upper abdominal pain, nausea, vomiting, diarrhea, fevers, chills, or diaphoresis.  Patient denies any testicular pain or scrotal swelling.  Patient states that he has recently been treated for urinary tract infection which 2 weeks ago he repeated a urinalysis that was unremarkable however he has never had issues such as this with retention.  Patient is concerned as he believes his prostate might be enlarged for which she presents at this time for further evaluation.  Patient did state that he also has a known right inguinal hernia but does not feel that this is worse in any way.  Patient is still passing gas without difficulty.  Patient denies use of any new medications to include antihistamine products or opioid products.    Records reviewed show patient was last seen in the ED on 11/6/2022 and admitted for AMS, syncope, urinary tract infection, atrial fibrillation.    Patient was last seen in the outpatient setting to PCP office on 8/21/2023 for acute bronchitis.    Patient's last urine culture performed on 11/6/2022 which showed 50,000 normal urogenital shefali.  No other previous urine cultures.    Review of Systems   Constitutional: Negative.  Negative for chills, diaphoresis and fever.   HENT: Negative.     Eyes: Negative.    Respiratory: Negative.     Cardiovascular: Negative.    Gastrointestinal:  Positive for abdominal pain (suprapubic). Negative for diarrhea, nausea and vomiting.   Genitourinary:   Positive for decreased urine volume and difficulty urinating. Negative for dysuria, flank pain, hematuria, penile discharge, scrotal swelling and testicular pain.   Musculoskeletal: Negative.    Skin: Negative.    Neurological: Negative.    Psychiatric/Behavioral: Negative.     All other systems reviewed and are negative.      No past medical history on file.    No Known Allergies    No past surgical history on file.    No family history on file.    Social History     Socioeconomic History    Marital status:            Objective   Physical Exam  Vitals and nursing note reviewed.   Constitutional:       General: He is not in acute distress.     Appearance: Normal appearance. He is well-developed and well-groomed. He is not ill-appearing, toxic-appearing or diaphoretic.   HENT:      Head: Normocephalic.   Eyes:      Conjunctiva/sclera: Conjunctivae normal.      Pupils: Pupils are equal, round, and reactive to light.   Cardiovascular:      Rate and Rhythm: Regular rhythm. Tachycardia present.   Pulmonary:      Effort: Pulmonary effort is normal.      Breath sounds: Normal breath sounds.   Abdominal:      General: Bowel sounds are normal. There is no distension.      Palpations: Abdomen is soft.      Tenderness: There is abdominal tenderness (suprapubic). There is no right CVA tenderness, left CVA tenderness or guarding.   Musculoskeletal:         General: Normal range of motion.      Cervical back: Neck supple.   Skin:     General: Skin is warm and dry.   Neurological:      Mental Status: He is alert and oriented to person, place, and time.      Gait: Gait normal.   Psychiatric:         Mood and Affect: Mood normal.         Behavior: Behavior normal. Behavior is cooperative.         Procedures           ED Course                                                       Medical Decision Making  Amount and/or Complexity of Data Reviewed  Independent Historian: spouse     Details: Wife  External Data Reviewed: labs,  radiology and notes.  Labs: ordered. Decision-making details documented in ED Course.  Radiology: ordered. Decision-making details documented in ED Course.  ECG/medicine tests: ordered. Decision-making details documented in ED Course.    Risk  Prescription drug management.        Patient is an 89-year-old male presenting to ED with urinary retention.  PMH significant for high cholesterol, long-term use Eliquis.  Upon initial evaluation patient resting the bed in no acute distress per nontoxic-appearing, non-ill-appearing, nondiaphoretic.  Patient is tachycardic and with hypertensive systolic pressures in the 170s but otherwise unremarkable vital signs.  Examination finds suprapubic discomfort with abdomen otherwise soft, nondistended.  No guarding.  No CVAT.  Examination is otherwise unremarkable.  Discussed with patient need for bladder scan, urinalysis, labs, and CT imaging as well as possible placement of Monet catheter for which she is amenable with no further questions, concerns, needs at this time.    Differential diagnosis: Acute urinary retention, prostate enlargement, ureteral stone, renal stone, urinary tract obstruction, incarcerated hernia, strangulated hernia, intra-abdominal mass, systemic infection, ELIZABETH, other    Lab work revealed leukocytosis 11.62.  H&H 12.3/37.4 which is improved from 11.7/36.4 when patient last had it checked 2 years ago.  Bands elevated at 2% relatively.  Normal platelets with no further CBC abnormalities.  CMP with no renal dysfunction.  Normal anion gap at 15.  Alk phos 145 with no further hepatic dysfunction and no electrolyte disturbances.  Low concern for pancreatic abnormality such as pancreatitis with normal lipase.  Low concern for systemic bacterial process with normal procalcitonin.   Urinalysis with large leukocytes, 21-50 WBCs and 4+ bacteria.  Patient was given a dose of Rocephin and a culture will be sent.  CT imaging of the abdomen and pelvis showed:  Well-positioned urinary bladder, diffusely thick-walled urinary bladder with diffuse prostate enlargement, prominent left renal pelvis dilation and moderate dilation of full length of the left ureter though no discrete obstructing lesion is seen. Initial bladder scan 999mL. After placement of Monet catheter patient reported significant improvement in his pain and discomfort.  Patient was given a dose of Rocephin and advised on need for continued outpatient antibiotics.  Advised patient that he will need to follow-up with urology for follow-up within the next 48 hours outpatient and will need to leave the catheter in until their discretion for removal.  Discussed strict return precautions and need for immediate return to ED should he develop any new or worsening symptoms.  Patient was very appreciative, had resolution of his initial hypertensive and tachycardic status after placement of the Monet catheter.  Patient was appreciative with no further questions, concerns, needs at this time and is stable for discharge    Final diagnoses:   Urinary retention   Enlarged prostate   Urinary tract infection without hematuria, site unspecified   Dilation of renal pelvis       ED Disposition  ED Disposition       ED Disposition   Discharge    Condition   Stable    Comment   --               Perez Manzo MD  [address] [id number]  [phone]    Schedule an appointment as soon as possible for a visit in 2 days      [facility] EMERGENCY DEPARTMENT  [address] [facility] [id number] [phone]    As needed    Maynor Perrin MD  [address] [facility]  [address]  [place] [id number] [phone]    Schedule an appointment as soon as possible for a visit in 2 days           Medication List        New Prescriptions      cefdinir 300 MG capsule  Commonly known as: OMNICEF  Take 1 capsule by mouth 2 (Two) Times a Day for 7 days.               Where to Get Your Medications        These  medications were sent to [facility] [address] [place] [address] - [address] - [phone]  - [phone]   [address], [facility] [address]      Phone: [phone]   cefdinir 300 MG capsule            Niko Pitt PA-C  04/05/25 0872

## 2025-04-06 NOTE — DISCHARGE INSTRUCTIONS
Please complete the antibiotics for your urinary tract infection in their entirety even if you begin to feel better.  Please call the urologist on Monday morning to schedule close outpatient follow-up and discuss removal of your Monet catheter.  Should you develop any new or worsening symptoms please return to the ER for further evaluation

## 2025-04-08 LAB — BACTERIA SPEC AEROBE CULT: ABNORMAL

## 2025-04-11 NOTE — PROGRESS NOTES
Subjective    Mr. Badillo is 89 y.o. male    Chief Complaint: Urinary Retention    History of Present Illness  89 year old male new patient for follow up after recent ED visit for urinary retention. Patient states that he went to the ED on Saturday 4/5 due to not being able to urinate. His bladder scan showed a volume of 999 ml and he hada garcia catheter placed. He states that he had a UTI 2 weeks ago and this was his second UTI in the past three years. He states for the past year he has noticed a weakened stream, frequency, and urgency. He gets up about 2-3 times per night. He has an inguinal hernia that occasionally bothers him. He states his bowels move regularly. He denies any dysuria, flank pain, or hematuria.     The following portions of the patient's history were reviewed and updated as appropriate: allergies, current medications, past family history, past medical history, past social history, past surgical history and problem list.    Review of Systems      Current Outpatient Medications:     apixaban (ELIQUIS) 5 MG tablet tablet, Take 1 tablet by mouth 2 (Two) Times a Day. (Patient not taking: Reported on 4/17/2025), Disp: 60 tablet, Rfl: 1    atorvastatin (Lipitor) 20 MG tablet, Take 1 tablet by mouth Every Night. (Patient not taking: Reported on 4/17/2025), Disp: 30 tablet, Rfl: 0    tamsulosin (FLOMAX) 0.4 MG capsule 24 hr capsule, Take 1 capsule by mouth Every Night for 360 days., Disp: 30 capsule, Rfl: 11    History reviewed. No pertinent past medical history.    History reviewed. No pertinent surgical history.    Social History     Socioeconomic History    Marital status:    Tobacco Use    Smoking status: Never     Passive exposure: Never    Smokeless tobacco: Never   Vaping Use    Vaping status: Never Used   Substance and Sexual Activity    Alcohol use: Never    Drug use: Never    Sexual activity: Defer       History reviewed. No pertinent family history.    Objective    There were no vitals  taken for this visit.    Physical Exam    Constitutional:No apparent distress; vitals reviewed as above  Psychiatric: Appropriate affect; alert and oriented  Musculoskeletal: Normal gait and station  Respiratory: No distress; unlabored movement; no accessory musculature needed with symmetric movements  Skin: No pallor or diaphoresis      Results for orders placed or performed during the hospital encounter of 04/05/25   Urine Culture - Urine, Indwelling Urethral Catheter    Collection Time: 04/05/25  6:51 PM    Specimen: Indwelling Urethral Catheter; Urine   Result Value Ref Range    Urine Culture (A)      >100,000 CFU/mL Klebsiella pneumoniae ssp pneumoniae       Susceptibility    Klebsiella pneumoniae ssp pneumoniae - DENITA     Amoxicillin + Clavulanate  Susceptible ug/ml     Ampicillin  Resistant      Ampicillin + Sulbactam  Susceptible ug/ml     Cefazolin (Urine)  Susceptible ug/ml     Cefepime  Susceptible ug/ml     Ceftazidime  Susceptible ug/ml     Ceftriaxone  Susceptible ug/ml     Gentamicin  Susceptible ug/ml     Levofloxacin  Susceptible ug/ml     Nitrofurantoin  Intermediate ug/ml     Piperacillin + Tazobactam  Susceptible ug/ml     Trimethoprim + Sulfamethoxazole  Susceptible ug/ml   Urinalysis With Culture If Indicated - Indwelling Urethral Catheter    Collection Time: 04/05/25  6:51 PM    Specimen: Indwelling Urethral Catheter; Urine   Result Value Ref Range    Color, UA Dark Yellow (A) Yellow, Straw    Appearance, UA Turbid (A) Clear    pH, UA 6.5 5.0 - 8.0    Specific Gravity, UA 1.015 1.005 - 1.030    Glucose, UA Negative Negative    Ketones, UA Negative Negative    Bilirubin, UA Negative Negative    Blood, UA Moderate (2+) (A) Negative    Protein,  mg/dL (2+) (A) Negative    Leuk Esterase, UA Large (3+) (A) Negative    Nitrite, UA Negative Negative    Urobilinogen, UA 1.0 E.U./dL 0.2 - 1.0 E.U./dL   Urinalysis, Microscopic Only - Indwelling Urethral Catheter    Collection Time: 04/05/25  6:51  PM    Specimen: Indwelling Urethral Catheter; Urine   Result Value Ref Range    RBC, UA 0-2 None Seen, 0-2 /HPF    WBC, UA 21-50 (A) None Seen, 0-2 /HPF    Bacteria, UA 4+ (A) None Seen /HPF    Squamous Epithelial Cells, UA None Seen None Seen, 0-2 /HPF    Hyaline Casts, UA None Seen None Seen /LPF    Methodology Manual Light Microscopy    Comprehensive Metabolic Panel    Collection Time: 04/05/25  7:08 PM    Specimen: Arm, Right; Blood   Result Value Ref Range    Glucose 123 (H) 65 - 99 mg/dL    BUN 24 (H) 8 - 23 mg/dL    Creatinine 0.96 0.76 - 1.27 mg/dL    Sodium 134 (L) 136 - 145 mmol/L    Potassium 4.3 3.5 - 5.2 mmol/L    Chloride 98 98 - 107 mmol/L    CO2 21.0 (L) 22.0 - 29.0 mmol/L    Calcium 9.1 8.6 - 10.5 mg/dL    Total Protein 7.4 6.0 - 8.5 g/dL    Albumin 3.7 3.5 - 5.2 g/dL    ALT (SGPT) 18 1 - 41 U/L    AST (SGOT) 21 1 - 40 U/L    Alkaline Phosphatase 145 (H) 39 - 117 U/L    Total Bilirubin 1.1 0.0 - 1.2 mg/dL    Globulin 3.7 gm/dL    A/G Ratio 1.0 g/dL    BUN/Creatinine Ratio 25.0 7.0 - 25.0    Anion Gap 15.0 5.0 - 15.0 mmol/L    eGFR 75.6 >60.0 mL/min/1.73   Lipase    Collection Time: 04/05/25  7:08 PM    Specimen: Arm, Right; Blood   Result Value Ref Range    Lipase 26 13 - 60 U/L   Procalcitonin    Collection Time: 04/05/25  7:08 PM    Specimen: Arm, Right; Blood   Result Value Ref Range    Procalcitonin 0.13 0.00 - 0.25 ng/mL   CBC Auto Differential    Collection Time: 04/05/25  7:08 PM    Specimen: Arm, Right; Blood   Result Value Ref Range    WBC 11.62 (H) 3.40 - 10.80 10*3/mm3    RBC 4.15 4.14 - 5.80 10*6/mm3    Hemoglobin 12.3 (L) 13.0 - 17.7 g/dL    Hematocrit 37.4 (L) 37.5 - 51.0 %    MCV 90.1 79.0 - 97.0 fL    MCH 29.6 26.6 - 33.0 pg    MCHC 32.9 31.5 - 35.7 g/dL    RDW 14.6 12.3 - 15.4 %    RDW-SD 47.9 37.0 - 54.0 fl    MPV 9.1 6.0 - 12.0 fL    Platelets 302 140 - 450 10*3/mm3   Manual Differential    Collection Time: 04/05/25  7:08 PM    Specimen: Arm, Right; Blood   Result Value Ref Range     Neutrophil % 86.9 (H) 42.7 - 76.0 %    Lymphocyte % 3.0 (L) 19.6 - 45.3 %    Monocyte % 4.0 (L) 5.0 - 12.0 %    Eosinophil % 0.0 (L) 0.3 - 6.2 %    Basophil % 0.0 0.0 - 1.5 %    Bands %  2.0 0.0 - 5.0 %    Atypical Lymphocyte % 4.0 0.0 - 5.0 %    Neutrophils Absolute 10.33 (H) 1.70 - 7.00 10*3/mm3    Lymphocytes Absolute 0.81 0.70 - 3.10 10*3/mm3    Monocytes Absolute 0.46 0.10 - 0.90 10*3/mm3    Eosinophils Absolute 0.00 0.00 - 0.40 10*3/mm3    Basophils Absolute 0.00 0.00 - 0.20 10*3/mm3    Acanthocytes Slight/1+ None Seen    Anisocytosis Slight/1+ None Seen    Crenated RBC's Large/3+ None Seen    Elliptocytes Mod/2+ None Seen    Poikilocytes Large/3+ None Seen    Polychromasia Slight/1+ None Seen    Vacuolated Neutrophils Slight/1+ None Seen    Platelet Morphology Normal Normal     Assessment and Plan    Diagnoses and all orders for this visit:    1. Urinary retention (Primary)  -     POC Urinalysis Dipstick, Multipro    2. Urine frequency  -     tamsulosin (FLOMAX) 0.4 MG capsule 24 hr capsule; Take 1 capsule by mouth Every Night for 360 days.  Dispense: 30 capsule; Refill: 11    Urinary retention in the setting of enlarged prostate with mild bothersome LUTS. Discussed with patient and he will start Tamsulosin daily. Will remove garcia catheter today after week of bladder and prostate rest. If patient unable to void today will reinsert catheter for another week of prostate and bladder rest. Patient will follow up in 1 year or sooner if needed.

## 2025-04-17 ENCOUNTER — OFFICE VISIT (OUTPATIENT)
Dept: UROLOGY | Facility: CLINIC | Age: OVER 89
End: 2025-04-17
Payer: MEDICARE

## 2025-04-17 DIAGNOSIS — R35.0 URINE FREQUENCY: ICD-10-CM

## 2025-04-17 DIAGNOSIS — R33.9 URINARY RETENTION: Primary | ICD-10-CM

## 2025-04-17 RX ORDER — TAMSULOSIN HYDROCHLORIDE 0.4 MG/1
1 CAPSULE ORAL NIGHTLY
Qty: 30 CAPSULE | Refills: 11 | Status: SHIPPED | OUTPATIENT
Start: 2025-04-17 | End: 2026-04-12

## 2025-04-17 NOTE — PROGRESS NOTES
Patient returned this afternoon unable to urinate. He reported he drank several bottles of water after leaving and has attempted to void but has been unsuccessful. Bladder scan revealed 574 mL. At this time ENA Campos stated to replace Monet catheter. Using sterile technique a new 18F coude catheter was placed, balloon inflated using 10cc sterile water,  700 cc's of urine drained from patients bladder via catheter then catheter was connected to leg bag.  Patient tolerated the procedure well.  The patient was advised to return in one week for catheter removal and to take flomax as directed. Patient verbalized understanding. ENA Campos was in the office at the time of procedure. WESLY Shankar RN.     I have reviewed and agree with medical assistance documentation above.

## 2025-04-21 NOTE — PROGRESS NOTES
Subjective    Mr. Badillo is 89 y.o. male    Chief Complaint: Urinary Retention    History of Present Illness    89 year old male established patient for follow up after recent ED visit for urinary retention. His garcia was removed at his last appointment and he was started onf Tamsulosin. He states he went home and was not able to urinate so he came back to the office and had a garcia catheter placed. He went to the ED on Saturday 4/5 due to not being able to urinate. His bladder scan showed a volume of 999 ml and he hada garcia catheter placed. He states that he had a UTI 2 weeks ago and this was his second UTI in the past three years. He states for the past year he has noticed a weakened stream, frequency, and urgency. He gets up about 2-3 times per night. He has an inguinal hernia that occasionally bothers him. He states his bowels move regularly. He denies any dysuria, flank pain, or hematuria.     The following portions of the patient's history were reviewed and updated as appropriate: allergies, current medications, past family history, past medical history, past social history, past surgical history and problem list.    Review of Systems      Current Outpatient Medications:     tamsulosin (FLOMAX) 0.4 MG capsule 24 hr capsule, Take 1 capsule by mouth Every Night for 360 days., Disp: 30 capsule, Rfl: 11    apixaban (ELIQUIS) 5 MG tablet tablet, Take 1 tablet by mouth 2 (Two) Times a Day. (Patient not taking: Reported on 4/24/2025), Disp: 60 tablet, Rfl: 1    atorvastatin (Lipitor) 20 MG tablet, Take 1 tablet by mouth Every Night. (Patient not taking: Reported on 4/24/2025), Disp: 30 tablet, Rfl: 0    History reviewed. No pertinent past medical history.    History reviewed. No pertinent surgical history.    Social History     Socioeconomic History    Marital status:    Tobacco Use    Smoking status: Never     Passive exposure: Never    Smokeless tobacco: Never   Vaping Use    Vaping status: Never Used  "  Substance and Sexual Activity    Alcohol use: Never    Drug use: Never    Sexual activity: Defer       History reviewed. No pertinent family history.    Objective    Temp 97.8 °F (36.6 °C)   Ht 180.3 cm (70.98\")   Wt 82.3 kg (181 lb 6.4 oz)   BMI 25.31 kg/m²     Physical Exam    Constitutional:No apparent distress; vitals reviewed as above  Psychiatric: Appropriate affect; alert and oriented  Musculoskeletal: Normal gait and station  Respiratory: No distress; unlabored movement; no accessory musculature needed with symmetric movements  Skin: No pallor or diaphoresis      Results for orders placed or performed during the hospital encounter of 04/05/25   Urine Culture - Urine, Indwelling Urethral Catheter    Collection Time: 04/05/25  6:51 PM    Specimen: Indwelling Urethral Catheter; Urine   Result Value Ref Range    Urine Culture (A)      >100,000 CFU/mL Klebsiella pneumoniae ssp pneumoniae       Susceptibility    Klebsiella pneumoniae ssp pneumoniae - DENITA     Amoxicillin + Clavulanate  Susceptible ug/ml     Ampicillin  Resistant      Ampicillin + Sulbactam  Susceptible ug/ml     Cefazolin (Urine)  Susceptible ug/ml     Cefepime  Susceptible ug/ml     Ceftazidime  Susceptible ug/ml     Ceftriaxone  Susceptible ug/ml     Gentamicin  Susceptible ug/ml     Levofloxacin  Susceptible ug/ml     Nitrofurantoin  Intermediate ug/ml     Piperacillin + Tazobactam  Susceptible ug/ml     Trimethoprim + Sulfamethoxazole  Susceptible ug/ml   Urinalysis With Culture If Indicated - Indwelling Urethral Catheter    Collection Time: 04/05/25  6:51 PM    Specimen: Indwelling Urethral Catheter; Urine   Result Value Ref Range    Color, UA Dark Yellow (A) Yellow, Straw    Appearance, UA Turbid (A) Clear    pH, UA 6.5 5.0 - 8.0    Specific Gravity, UA 1.015 1.005 - 1.030    Glucose, UA Negative Negative    Ketones, UA Negative Negative    Bilirubin, UA Negative Negative    Blood, UA Moderate (2+) (A) Negative    Protein,  mg/dL " (2+) (A) Negative    Leuk Esterase, UA Large (3+) (A) Negative    Nitrite, UA Negative Negative    Urobilinogen, UA 1.0 E.U./dL 0.2 - 1.0 E.U./dL   Urinalysis, Microscopic Only - Indwelling Urethral Catheter    Collection Time: 04/05/25  6:51 PM    Specimen: Indwelling Urethral Catheter; Urine   Result Value Ref Range    RBC, UA 0-2 None Seen, 0-2 /HPF    WBC, UA 21-50 (A) None Seen, 0-2 /HPF    Bacteria, UA 4+ (A) None Seen /HPF    Squamous Epithelial Cells, UA None Seen None Seen, 0-2 /HPF    Hyaline Casts, UA None Seen None Seen /LPF    Methodology Manual Light Microscopy    Comprehensive Metabolic Panel    Collection Time: 04/05/25  7:08 PM    Specimen: Arm, Right; Blood   Result Value Ref Range    Glucose 123 (H) 65 - 99 mg/dL    BUN 24 (H) 8 - 23 mg/dL    Creatinine 0.96 0.76 - 1.27 mg/dL    Sodium 134 (L) 136 - 145 mmol/L    Potassium 4.3 3.5 - 5.2 mmol/L    Chloride 98 98 - 107 mmol/L    CO2 21.0 (L) 22.0 - 29.0 mmol/L    Calcium 9.1 8.6 - 10.5 mg/dL    Total Protein 7.4 6.0 - 8.5 g/dL    Albumin 3.7 3.5 - 5.2 g/dL    ALT (SGPT) 18 1 - 41 U/L    AST (SGOT) 21 1 - 40 U/L    Alkaline Phosphatase 145 (H) 39 - 117 U/L    Total Bilirubin 1.1 0.0 - 1.2 mg/dL    Globulin 3.7 gm/dL    A/G Ratio 1.0 g/dL    BUN/Creatinine Ratio 25.0 7.0 - 25.0    Anion Gap 15.0 5.0 - 15.0 mmol/L    eGFR 75.6 >60.0 mL/min/1.73   Lipase    Collection Time: 04/05/25  7:08 PM    Specimen: Arm, Right; Blood   Result Value Ref Range    Lipase 26 13 - 60 U/L   Procalcitonin    Collection Time: 04/05/25  7:08 PM    Specimen: Arm, Right; Blood   Result Value Ref Range    Procalcitonin 0.13 0.00 - 0.25 ng/mL   CBC Auto Differential    Collection Time: 04/05/25  7:08 PM    Specimen: Arm, Right; Blood   Result Value Ref Range    WBC 11.62 (H) 3.40 - 10.80 10*3/mm3    RBC 4.15 4.14 - 5.80 10*6/mm3    Hemoglobin 12.3 (L) 13.0 - 17.7 g/dL    Hematocrit 37.4 (L) 37.5 - 51.0 %    MCV 90.1 79.0 - 97.0 fL    MCH 29.6 26.6 - 33.0 pg    MCHC 32.9 31.5 -  35.7 g/dL    RDW 14.6 12.3 - 15.4 %    RDW-SD 47.9 37.0 - 54.0 fl    MPV 9.1 6.0 - 12.0 fL    Platelets 302 140 - 450 10*3/mm3   Manual Differential    Collection Time: 04/05/25  7:08 PM    Specimen: Arm, Right; Blood   Result Value Ref Range    Neutrophil % 86.9 (H) 42.7 - 76.0 %    Lymphocyte % 3.0 (L) 19.6 - 45.3 %    Monocyte % 4.0 (L) 5.0 - 12.0 %    Eosinophil % 0.0 (L) 0.3 - 6.2 %    Basophil % 0.0 0.0 - 1.5 %    Bands %  2.0 0.0 - 5.0 %    Atypical Lymphocyte % 4.0 0.0 - 5.0 %    Neutrophils Absolute 10.33 (H) 1.70 - 7.00 10*3/mm3    Lymphocytes Absolute 0.81 0.70 - 3.10 10*3/mm3    Monocytes Absolute 0.46 0.10 - 0.90 10*3/mm3    Eosinophils Absolute 0.00 0.00 - 0.40 10*3/mm3    Basophils Absolute 0.00 0.00 - 0.20 10*3/mm3    Acanthocytes Slight/1+ None Seen    Anisocytosis Slight/1+ None Seen    Crenated RBC's Large/3+ None Seen    Elliptocytes Mod/2+ None Seen    Poikilocytes Large/3+ None Seen    Polychromasia Slight/1+ None Seen    Vacuolated Neutrophils Slight/1+ None Seen    Platelet Morphology Normal Normal     Assessment and Plan    Diagnoses and all orders for this visit:    1. Urinary retention (Primary)    2. Benign prostatic hyperplasia with urinary retention    Urinary retention in the setting of enlarged prostate with mild bothersome LUTS.  Will remove garcia catheter today after week of bladder and prostate rest and taking Tamsulosin. He will continue Tamsulosin.  If patient unable to void today will reinsert catheter and discuss the need for a cystoscopy. Patient will follow up in 3 months or sooner if needed.

## 2025-04-24 ENCOUNTER — OFFICE VISIT (OUTPATIENT)
Dept: UROLOGY | Facility: CLINIC | Age: OVER 89
End: 2025-04-24
Payer: MEDICARE

## 2025-04-24 VITALS — TEMPERATURE: 97.8 F | WEIGHT: 181.4 LBS | HEIGHT: 71 IN | BODY MASS INDEX: 25.4 KG/M2

## 2025-04-24 DIAGNOSIS — N40.1 BENIGN PROSTATIC HYPERPLASIA WITH URINARY RETENTION: ICD-10-CM

## 2025-04-24 DIAGNOSIS — R33.9 URINARY RETENTION: Primary | ICD-10-CM

## 2025-04-24 DIAGNOSIS — R33.8 BENIGN PROSTATIC HYPERPLASIA WITH URINARY RETENTION: ICD-10-CM

## 2025-04-24 NOTE — PROGRESS NOTES
Patient returned to the clinic this afternoon unable to urinate. He states he has the urge but cannot void when attempting. Bladder scan performed revealed 355 cc's. Per ENA Campos Monet catheter was replaced. Using sterile technique a new 18F coude catheter was placed, balloon inflated using 10cc sterile water,  400 cc's of yellow urine drained from patients bladder via catheter then catheter was connected to leg bag.  Patient tolerated the procedure without pain or difficulty.  The patient was advised to return in 7-10 days for next catheter removal and to continue to take his flomax as directed. Patient verbalized understanding. ENA Campos was in the office at the time of procedure. WESLY Shankar RN.     I have reviewed and agree with medical assistance documentation above.

## 2025-04-29 ENCOUNTER — TELEPHONE (OUTPATIENT)
Dept: UROLOGY | Facility: CLINIC | Age: OVER 89
End: 2025-04-29
Payer: MEDICARE

## 2025-04-29 NOTE — TELEPHONE ENCOUNTER
----- Message from Evi GÓMEZ sent at 4/25/2025 11:29 AM CDT -----  Regarding: reschedule appt  This patient has a nurse visit scheduled for a week, but Clara talked to Aydin and Aydin stated 2 weeks out nurse visit with a voiding trial. Please call patient and reschedule his nurse visit to two weeks from yesterday. Thank y'all!

## 2025-04-29 NOTE — TELEPHONE ENCOUNTER
Called patient and explained to him we needed to move his appointment for his voiding trial out about 6 days per Dr. Perrin.  We rescheduled his nurse visit from 5/2/25 to 5/8/25 at 9:00am.  Patient verbally agreed to this new date/time for his voiding trial.

## 2025-05-08 ENCOUNTER — PROCEDURE VISIT (OUTPATIENT)
Dept: UROLOGY | Facility: CLINIC | Age: OVER 89
End: 2025-05-08
Payer: MEDICARE

## 2025-05-08 VITALS — HEART RATE: 103 BPM | OXYGEN SATURATION: 98 % | DIASTOLIC BLOOD PRESSURE: 62 MMHG | SYSTOLIC BLOOD PRESSURE: 112 MMHG

## 2025-05-08 DIAGNOSIS — R33.9 URINARY RETENTION: Primary | ICD-10-CM

## 2025-05-08 NOTE — PROGRESS NOTES
Patient of Pilar SUTHERLAND states he is here today to have his catheter removed. The patient denies any fever, chills or  N&V. Using the catheter in place and sterile water 200 cc was installed into the bladder with no complications. Patient was able to urinate 100 cc.   Patient advised to keep his follow up in July with Pilar and to call the office with any questions or concerns. If pt. Continues to have issues with retention and requires re-catheterization, pt. Will need to be scheduled for cystoscopy. The patient verbalized understanding. Pilar SUTHERLAND was in the office at the time of procedure.     Patient was advised to drink clear fluids for the next couple hours and urinate. he was advised he may experience some blood in the urine and burning with urination for the next couple days. If the patient is unable to urinate or develops fever, chills, N&V or suprapubic pain he will call to return for an appt at clinic or seek medical treatment at [facility] ER, PCP or Urgent Care after hours. Patient verbalized understanding and all questions were answered. DUSTIN Green RN     I have reviewed and agree with medical assistance documentation above

## 2025-07-11 NOTE — PROGRESS NOTES
"Subjective    Mr. Badillo is 89 y.o. male    Chief Complaint: Urinary Retention    History of Present Illness    89 year old male established patient with history of BPH and urinary retention. He had a successful voiding trial at his last appointment. He states that he did increase his Tamsulosin to twice daily and he has had no urinary issues. He was previously seen in the ED on 4/5 for acute urinary retention. He denies any dysuria, flank pain, or hematuria. He is a non-smoker and he denies family history of bladder cancer. His urine today shows small blood and large leukocytes. He does have  RBC per HPF.      The following portions of the patient's history were reviewed and updated as appropriate: allergies, current medications, past family history, past medical history, past social history, past surgical history and problem list.    Review of Systems      Current Outpatient Medications:     tamsulosin (FLOMAX) 0.4 MG capsule 24 hr capsule, Take 2 capsules by mouth Every Night for 360 days., Disp: 180 capsule, Rfl: 3    apixaban (ELIQUIS) 5 MG tablet tablet, Take 1 tablet by mouth 2 (Two) Times a Day. (Patient not taking: Reported on 4/17/2025), Disp: 60 tablet, Rfl: 1    atorvastatin (Lipitor) 20 MG tablet, Take 1 tablet by mouth Every Night. (Patient not taking: Reported on 4/17/2025), Disp: 30 tablet, Rfl: 0    History reviewed. No pertinent past medical history.    History reviewed. No pertinent surgical history.    Social History     Socioeconomic History    Marital status:    Tobacco Use    Smoking status: Never     Passive exposure: Never    Smokeless tobacco: Never   Vaping Use    Vaping status: Never Used   Substance and Sexual Activity    Alcohol use: Never    Drug use: Never    Sexual activity: Defer       History reviewed. No pertinent family history.    Objective    Temp 97.7 °F (36.5 °C) (Infrared)   Ht 180.3 cm (71\")   Wt 80.2 kg (176 lb 12.8 oz)   BMI 24.66 kg/m²     Physical " Exam    Constitutional:No apparent distress; vitals reviewed as above  Psychiatric: Appropriate affect; alert and oriented  Musculoskeletal: Normal gait and station  Respiratory: No distress; unlabored movement; no accessory musculature needed with symmetric movements  Skin: No pallor or diaphoresis      Results for orders placed or performed in visit on 07/24/25   POC Urinalysis Dipstick, Multipro    Collection Time: 07/24/25  9:56 AM    Specimen: Urine   Result Value Ref Range    Color Yellow Yellow, Straw, Dark Yellow, Gali    Clarity, UA Clear Clear    Glucose, UA Negative Negative mg/dL    Bilirubin Negative Negative    Ketones, UA Negative Negative    Specific Gravity  1.015 1.005 - 1.030    Blood, UA Small (A) Negative    pH, Urine 6.0 5.0 - 8.0    Protein, POC 30 mg/dL (A) Negative mg/dL    Urobilinogen, UA 0.2 E.U./dL Normal, 0.2 E.U./dL    Nitrite, UA Negative Negative    Leukocytes Large (3+) (A) Negative   IPSS Questionnaire (AUA-7):  Incomplete emptying  Over the past month, how often have you had a sensation of not emptying your bladder completely after you finished urinating?: About half the time (07/24/25 0942)  Frequency  Over the past month, how often have you had to urinate again less than two hours after you finishied urinating ?: Less than half the time (07/24/25 0942)  Intermittency  Over the past month, how often have you found you stopped and started again several time when you urinated ?: Less than half the time (07/24/25 0942)  Urgency  Over the last month, how often have you found it difficult  have you found it difficult to postpone urination ?: Less than 1 time in 5 (07/24/25 0942)  Weak Stream  Over the past month, how often have you had a weak urinary stream ?: Less than 1 time in 5 (07/24/25 0942)  Straining  Over the past month, how often have you had to push or strain to begin urination ?: Not at all (07/24/25 0942)  Nocturia  Over the past month, how many times did you most  typically get up to urinate from the time you went to bed until the time you got up in the morning ?: 2 times (07/24/25 0942)  Quality of life due to urinary symptoms  If you were to spend the rest of your life with your urinary condition the way it is now, how would feel about that?: Mostly satisfied (07/24/25 0942)    Scores  Total IPSS Score: (!) 11 (07/24/25 0942)  Total Score = Symptomatic Level: Moderately symptomatic: 8-19 (07/24/25 0942)      Estimation of residual urine via abdominal ultrasound  Residual Urine: 174 ml  Indication: Urine Retention  Position: Supine  Examination: Incremental scanning of the suprapubic area using 3 MHz transducer using copious amounts of acoustic gel.   Findings: An anechoic area was demonstrated which represented the bladder, with measurement of residual urine as noted. I inspected this myself. In that the residual urine was stable or insignificant, no treatment will be necessary at this time.       Assessment and Plan    Diagnoses and all orders for this visit:    1. Urinary retention (Primary)  -     POC Urinalysis Dipstick, Multipro    2. Urine frequency  -     tamsulosin (FLOMAX) 0.4 MG capsule 24 hr capsule; Take 2 capsules by mouth Every Night for 360 days.  Dispense: 180 capsule; Refill: 3    3. Benign prostatic hyperplasia with urinary retention    4. Microhematuria      Patient doing well with low PVR and bothersome LUTS currently controlled on maximum dosage of Tamsulosin. He will continue Tamsulosin. Urine with microhematuria, discussed that we can do a full work up on this to rule out any bladder cancer. I suspect hematuria related to prostate but we will repeat his urine in 3 months and if still present will order CT urogram. He is agreeable with plan. Patient will return in 3 months or sooner if needed.

## 2025-07-24 ENCOUNTER — OFFICE VISIT (OUTPATIENT)
Dept: UROLOGY | Facility: CLINIC | Age: OVER 89
End: 2025-07-24
Payer: MEDICARE

## 2025-07-24 VITALS — WEIGHT: 176.8 LBS | TEMPERATURE: 97.7 F | HEIGHT: 71 IN | BODY MASS INDEX: 24.75 KG/M2

## 2025-07-24 DIAGNOSIS — N40.1 BENIGN PROSTATIC HYPERPLASIA WITH URINARY RETENTION: ICD-10-CM

## 2025-07-24 DIAGNOSIS — R33.9 URINARY RETENTION: Primary | ICD-10-CM

## 2025-07-24 DIAGNOSIS — R33.8 BENIGN PROSTATIC HYPERPLASIA WITH URINARY RETENTION: ICD-10-CM

## 2025-07-24 DIAGNOSIS — R31.29 MICROHEMATURIA: ICD-10-CM

## 2025-07-24 DIAGNOSIS — R35.0 URINE FREQUENCY: ICD-10-CM

## 2025-07-24 LAB
BILIRUB BLD-MCNC: NEGATIVE MG/DL
CLARITY, POC: CLEAR
COLOR UR: YELLOW
GLUCOSE UR STRIP-MCNC: NEGATIVE MG/DL
KETONES UR QL: NEGATIVE
LEUKOCYTE EST, POC: ABNORMAL
NITRITE UR-MCNC: NEGATIVE MG/ML
PH UR: 6 [PH] (ref 5–8)
PROT UR STRIP-MCNC: ABNORMAL MG/DL
RBC # UR STRIP: ABNORMAL /UL
SP GR UR: 1.01 (ref 1–1.03)
UROBILINOGEN UR QL: ABNORMAL

## 2025-07-24 RX ORDER — TAMSULOSIN HYDROCHLORIDE 0.4 MG/1
2 CAPSULE ORAL NIGHTLY
Qty: 180 CAPSULE | Refills: 3 | Status: SHIPPED | OUTPATIENT
Start: 2025-07-24 | End: 2026-07-19